# Patient Record
Sex: FEMALE | Race: WHITE | NOT HISPANIC OR LATINO | Employment: FULL TIME | URBAN - METROPOLITAN AREA
[De-identification: names, ages, dates, MRNs, and addresses within clinical notes are randomized per-mention and may not be internally consistent; named-entity substitution may affect disease eponyms.]

---

## 2018-08-23 ENCOUNTER — OFFICE VISIT (OUTPATIENT)
Dept: INTERNAL MEDICINE | Facility: MEDICAL CENTER | Age: 53
End: 2018-08-23
Payer: COMMERCIAL

## 2018-08-23 VITALS
SYSTOLIC BLOOD PRESSURE: 125 MMHG | OXYGEN SATURATION: 96 % | DIASTOLIC BLOOD PRESSURE: 70 MMHG | HEIGHT: 63 IN | BODY MASS INDEX: 19.14 KG/M2 | HEART RATE: 83 BPM | TEMPERATURE: 98.1 F | WEIGHT: 108 LBS

## 2018-08-23 DIAGNOSIS — Z00.00 HEALTHCARE MAINTENANCE: ICD-10-CM

## 2018-08-23 DIAGNOSIS — Z86.32 HISTORY OF GESTATIONAL DIABETES: ICD-10-CM

## 2018-08-23 DIAGNOSIS — S46.002S INJURY OF LEFT ROTATOR CUFF, SEQUELA: ICD-10-CM

## 2018-08-23 DIAGNOSIS — Z98.890 HX OF ARTHROSCOPY OF SHOULDER: ICD-10-CM

## 2018-08-23 DIAGNOSIS — N95.1 VASOMOTOR SYMPTOMS DUE TO MENOPAUSE: ICD-10-CM

## 2018-08-23 DIAGNOSIS — Z82.49 FAMILY HISTORY OF CARDIAC DISORDER: ICD-10-CM

## 2018-08-23 DIAGNOSIS — Z72.0 TOBACCO USE: ICD-10-CM

## 2018-08-23 PROBLEM — S46.002A INJURY OF LEFT ROTATOR CUFF: Status: ACTIVE | Noted: 2018-08-23

## 2018-08-23 PROCEDURE — 99204 OFFICE O/P NEW MOD 45 MIN: CPT | Performed by: INTERNAL MEDICINE

## 2018-08-23 RX ORDER — VENLAFAXINE HYDROCHLORIDE 75 MG/1
75 CAPSULE, EXTENDED RELEASE ORAL DAILY
Qty: 30 CAP | Refills: 3 | Status: SHIPPED
Start: 2018-08-23 | End: 2018-11-05

## 2018-08-23 RX ORDER — ACETAMINOPHEN 325 MG/1
650 TABLET ORAL EVERY 6 HOURS PRN
Qty: 30 TAB | Refills: 1 | Status: SHIPPED
Start: 2018-08-23 | End: 2019-02-21

## 2018-08-23 RX ORDER — HYDROCODONE BITARTRATE AND ACETAMINOPHEN 7.5; 325 MG/1; MG/1
1-2 TABLET ORAL EVERY 6 HOURS PRN
COMMUNITY
End: 2018-08-23

## 2018-08-23 RX ORDER — MELOXICAM 7.5 MG/1
7.5 TABLET ORAL 2 TIMES DAILY PRN
Qty: 30 TAB | Refills: 1 | Status: SHIPPED
Start: 2018-08-23 | End: 2018-11-05

## 2018-08-23 RX ORDER — IBUPROFEN 200 MG
200 TABLET ORAL EVERY 6 HOURS PRN
COMMUNITY
End: 2018-08-23

## 2018-08-23 NOTE — PROGRESS NOTES
Michelle Shaw is a 53 y.o. female who is here to Establish care and is new to the clinic.     CC: Establish care, vasomotor symptoms, left shoulder surgery    HPI:    Patient is a 53-year-old female who is here to establish care today. Patient recently had a injury to her left rotator cuff and underwent left shoulder arthroscopy. She said she saw orthopedic in [facility] which is close to [place]. This happened 3 weeks ago. She says that she thinks she hurt herself at work in January with doing repetitive movements. She states that she was having numbness and tingling, was having panic attacks as well. She went to the ER at that time there was a CT scan which was done and they thought she was having a stroke and was admitted for MRI. She after that she saw orthopedic and did physical therapy for the months of February, March, April, May, and June. She went to see the orthopedic again but wasn't satisfied so saw another one who recommended surgery and thus when she got that done. Her arm is still in the sling. She has not gotten any physical therapy but was given orders for it before she moved here.    In regards to go her past medical history, patients that she does have a history of gestational diabetes. Patient was also on medication for it at some point for presumable diagnosis of diabetes. She now gets hypoglycemic episodes which she says her about twice a week. She says she eats healthy and tries to stay away from sugar. She also mentions a history of diverticulitis treated pregnancy but not having any problems at this time.    She doesn't really have any other past significant history. With her social history, she has been smoking cigarettes since the age of 17. She quit through her pregnancies but then went back to it. Her  also smokes and she is not interested in quitting at this time. In regards to alcohol use, patient does have a beer once in a blue moon and denies any other drug use. 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 She no longer uses any of those substances.    Patient is postmenopausal and she says she has a lot of hot flashes as well as mood changes and was wondering if she may be able to try estrogen. We talked about the risk of using estrogen as well as tobacco use at the scene time and patient is okay with trying different medication.    In regards to medications, patient is sitting ibuprofen at this time. When she wakes up, she takes 600 mg in the morning and 6 mg in the evening before going to sleep. She was given Norco for her pain as well but she says she took today and it knocked her off so she stopped taking it. She is not complaining of any acid problems or any problems with her kidneys in the past.    Patient does have allergies to aspirin and penicillin. She had lab work done before her surgery. Her colonoscopy was in 2017 and she also had a mammogram in 2017. She had a Pap smear 6 months ago which was normal. She is complaining of any other thing at this time.      No problem-specific Assessment & Plan notes found for this encounter.      She  has a past medical history of Gestational diabetes.    Patient Active Problem List    Diagnosis Date Noted   • Healthcare maintenance 08/23/2018   • History of gestational diabetes 08/23/2018   • Injury of left rotator cuff 08/23/2018   • Tobacco use 08/23/2018   • Hx of arthroscopy of shoulder 08/23/2018       Allergies:Aspirin and Penicillins    Current Outpatient Prescriptions   Medication Sig Dispense Refill   • acetaminophen (TYLENOL) 325 MG Tab Take 2 Tabs by mouth every 6 hours as needed. 30 Tab 1   • meloxicam (MOBIC) 7.5 MG Tab Take 1 Tab by mouth 2 times a day as needed. 30 Tab 1   • venlafaxine XR (EFFEXOR XR) 75 MG CAPSULE SR 24 HR Take 1 Cap by mouth every day. 30 Cap 3     No  "current facility-administered medications for this visit.        Social History   Substance Use Topics   • Smoking status: Current Every Day Smoker   • Smokeless tobacco: Never Used      Comment: 10 ciggs a day    • Alcohol use Yes      Comment: occass       Family History   Problem Relation Age of Onset   • Lung Disease Mother    • Heart Disease Mother    • Heart Disease Father    • Diabetes Father    • Hyperlipidemia Father    • No Known Problems Brother    • No Known Problems Brother      Review of Systems:   Pertinent positives as stated in HPI, all others reviewed as negative.    Physical Exam:  Blood pressure 125/70, pulse 83, temperature 36.7 °C (98.1 °F), height 1.6 m (5' 3\"), weight 49 kg (108 lb), SpO2 96 %. Body mass index is 19.13 kg/m².    General Appearance: healthy, alert, no distress, cooperative  Skin: Skin color, texture, turgor normal. No rashes or lesions.  Head: Normocephalic. No masses appreciated.   Eyes: PERRLA.  Ears: External ears normal.  Nose/Sinuses: Nares normal. Mucosa normal.   Oropharynx: Has Dentures (Used methamphetamine in the past which causes abscess in her oral cavity) Oropharynx moist and without lesion.  Neck: Neck supple. No adenopathy. Thyroid symmetric, normal size, and without nodularity.  Lungs: Lungs clear to auscultation bilaterally.  Heart: RRR without murmur, gallop, or rubs.  Abdomen: Soft, non-tender. BS normal.   Musculoskeletal: Extremities: Upper and lower extremities appear normal. No deformities, edema. Left shoulder in sling.   Peripheral Pulses: Pulses: radial=4/4, dorsalis pedis=4/4  Neurologic: Cranial nerves intact.   Psychiatric: Mood appears normal.     Assessment/Plan:     1. Healthcare maintenance  Colonoscopy was in 2017.  Mammogram was in 2017. She wants to do a mammogram every 2 years.  Pap smear was 6 months ago and was normal.  Will do routine labs.  She is going to be establishing with our ophthalmologist as well as a dentist.  - CBC WITH " DIFFERENTIAL; Future  - COMP METABOLIC PANEL; Future  - LIPID PROFILE; Future    2. History of gestational diabetes  No concerns at this time but she says she does have some episodes of hypoglycemia from time to time.  Check hemoglobin A1c.  - HEMOGLOBIN A1C; Future    3. Injury of left rotator cuff, sequela  Check vitamin D level.  Will make a referral for her to see orthopedic.  Recommended to stop ibuprofen and try meloxicam and alternated with Tylenol to see if that might help the pain.  Will have orthopedic determine when she can get physical therapy.  - VITAMIN D,25 HYDROXY; Future  - acetaminophen (TYLENOL) 325 MG Tab; Take 2 Tabs by mouth every 6 hours as needed.  Dispense: 30 Tab; Refill: 1  - meloxicam (MOBIC) 7.5 MG Tab; Take 1 Tab by mouth 2 times a day as needed.  Dispense: 30 Tab; Refill: 1  - REFERRAL TO ORTHOPEDICS    4. Hx of arthroscopy of shoulder  See above.  - VITAMIN D,25 HYDROXY; Future  - acetaminophen (TYLENOL) 325 MG Tab; Take 2 Tabs by mouth every 6 hours as needed.  Dispense: 30 Tab; Refill: 1  - meloxicam (MOBIC) 7.5 MG Tab; Take 1 Tab by mouth 2 times a day as needed.  Dispense: 30 Tab; Refill: 1  - REFERRAL TO ORTHOPEDICS    5. Family history of cardiac disorder  - LIPID PROFILE; Future    6. Tobacco use  She was counseled on cessation but she is not interested.  Will order PFTs at next visit.   - CBC WITH DIFFERENTIAL; Future    7. Vasomotor symptoms due to menopause  Patient wanted to talk about estrogen but with her tobacco use, we talked about how it is contraindicated and there is a lot of risk involved.  She is okay with trying Effexor and see if it may help her symptoms.  - venlafaxine XR (EFFEXOR XR) 75 MG CAPSULE SR 24 HR; Take 1 Cap by mouth every day.  Dispense: 30 Cap; Refill: 3      Followup: Return in about 3 months (around 11/23/2018), or if symptoms worsen or fail to improve.    This note was created using voice recognition software. There may be unintended errors  spelling, and grammar or content.

## 2018-10-02 ENCOUNTER — PHYSICAL THERAPY (OUTPATIENT)
Dept: PHYSICAL THERAPY | Facility: REHABILITATION | Age: 53
End: 2018-10-02
Attending: ORTHOPAEDIC SURGERY
Payer: COMMERCIAL

## 2018-10-02 DIAGNOSIS — M25.512 CHRONIC LEFT SHOULDER PAIN: ICD-10-CM

## 2018-10-02 DIAGNOSIS — M75.122 COMPLETE ROTATOR CUFF TEAR OR RUPTURE OF LEFT SHOULDER, NOT SPECIFIED AS TRAUMATIC: ICD-10-CM

## 2018-10-02 DIAGNOSIS — G89.29 CHRONIC LEFT SHOULDER PAIN: ICD-10-CM

## 2018-10-02 PROCEDURE — 97161 PT EVAL LOW COMPLEX 20 MIN: CPT

## 2018-10-02 PROCEDURE — 97014 ELECTRIC STIMULATION THERAPY: CPT

## 2018-10-02 ASSESSMENT — ENCOUNTER SYMPTOMS
PAIN TIMING: IN THE MORNING
PAIN SCALE AT HIGHEST: 10
QUALITY: ACHING
PAIN SCALE: 8
PAIN SCALE AT LOWEST: 2
QUALITY: TIGHT
QUALITY: TINGLING
ALLEVIATING FACTORS: REST

## 2018-10-02 NOTE — OP THERAPY EVALUATION
Outpatient Physical Therapy  INITIAL EVALUATION    [facility] [address]  [address]  [place] [address]  Phone:  [phone]  Fax:  [phone]    Date of Evaluation: 10/02/2018    Patient: Michelle Shaw  YOB: 1965  MRN: [id number]     Referring Provider: Chandu Hamilton M.D.  [address] [place] [address]   Referring Diagnosis Complete rotator cuff tear or rupture of left shoulder, not specified as traumatic [M75.122];Complete rupture of rotator cuff [M75.120];Pain in left shoulder [M25.512]     Time Calculation  Start time: 0900  Stop time: 0953 Time Calculation (min): 53 minutes     Physical Therapy Occurrence Codes    Date physical therapy care plan established or reviewed:  10/2/18   Date physical therapy treatment started:  10/2/18          Chief Complaint: Shoulder Problem    Visit Diagnoses     ICD-10-CM   1. Chronic left shoulder pain M25.512    G89.29   2. Complete rotator cuff tear or rupture of left shoulder, not specified as traumatic M75.122         Subjective:   History of Present Illness:     Mechanism of injury:    Pt presents with complaint of L shoulder pain.  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 She was having numbness and tingling in the L arm, as well as panic attacks. She went to the ED due to feeling like she was having a stroke, a CT scan was done and cleared.  Ultimately MRI of the shoulder and neck, some tearing of the supra. See reports in media.  Saw an orthopedic in [place] area and she did physical therapy from February-June, poor overall progress. Poor response to injections.  She went to see the orthopedic again, ultimately surgery was recommended and completed 7/31/18 (in [place]).  She moved shortly after surgery and post-op care has been delayed.  Feels like the shoulder is worse than pre-op in regards to movement and pain.     Prior level of function:  Working FT, on disability since the injury. Hobbies prior to injury: walking, cooking.   Sleep disturbance:  Interrupted sleep  Pain:     Current pain ratin    At best pain ratin    At worst pain rating:  10    Quality:  Aching, tight and tingling    Pain timing:  In the morning    Relieving factors:  Rest (Ibuprofen 3x/day (3-4 200 mg per day), hot water in the shower)    Exacerbated by: any use of the L UE.    Progression:  Stable  Social Support:     Lives in:  Multiple-level home and apartment    Lives with:  Spouse  Hand dominance:  Right  Diagnostic Tests:     MRI studies: abnormal    Treatments:     Previous treatment:  Physical therapy and injection treatment  Patient Goals:     Patient goals for therapy:  Decreased pain, increased motion, independence with ADLs/IADLs, return to work, increased strength and return to sport/leisure activities      Past Medical History:   Diagnosis Date   • Gestational diabetes      Past Surgical History:   Procedure Laterality Date   • ACL REPAIR Bilateral    • APPENDECTOMY     • SHOULDER ARTHROSCOPY Left    • TUBAL COAGULATION LAPAROSCOPIC BILATERAL       Social History   Substance Use Topics   • Smoking status: Current Every Day Smoker   • Smokeless tobacco: Never Used      Comment: 10 ciggs a day    • Alcohol use Yes      Comment: occass     Family and Occupational History     Social History   • Marital status:      Spouse name: N/A   • Number of children: N/A   • Years of education: N/A       Objective     Observations   Left Shoulder   Positive for incision.     Additional Observation Details  Incision is well healed and mobile.  Mild redness at the anterior port.     Postural Observations  Seated posture: poor  Standing posture: poor    Additional Postural Observation Details  Ant seated GHJ, severe fwd shoulder.     Neurological Testing     Sensation     Shoulder   Left Shoulder   Intact: light touch    Right Shoulder    Intact: light touch    Reflexes   Left   Biceps (C5/C6): normal (2+)  Brachioradialis (C6): normal (2+)    Right   Biceps (C5/C6): normal (2+)  Brachioradialis (C6): normal (2+)    Palpation   Left   Tenderness of the anterior deltoid, biceps, infraspinatus, levator scapulae, pectoralis major, pectoralis minor, subscapularis, supraspinatus and upper trapezius.     Tenderness     Left Shoulder   Tenderness in the bicipital groove, infraspinatus tendon, subacromial bursa, subscapularis tendon and supraspinatus tendon.     Active Range of Motion   Left Shoulder   Flexion: 70 degrees with pain  Abduction: 55 degrees with pain  External rotation BTH: Active external rotation behind the head: occiput. with pain  Internal rotation BTB: Active internal rotation behind the back: glute. with pain    Right Shoulder   Normal active range of motion    Scapular Mobility   Left Shoulder   Scapular mobility: fair    Right Shoulder   Scapular mobility: good    Joint Play   Left Shoulder     Anterior capsule: hypomobile    Posterior capsule: hypomobile    Inferior capsule: hypomobile    1st rib: hypomobile    Strength:      Left Shoulder   Planes of Motion   Flexion: 3   Extension: 4-   Abduction: 3-   Adduction: 3+   External rotation at 0°: 3+   Internal rotation at 0°: 3+     Right Shoulder   Normal muscle strength        Therapeutic Treatments and Modalities:     1. E Stim Unattended (CPT 36254), IFC and MH to L shoulder x 15 min    Therapeutic Treatment and Modalities Summary: HO given for TENS unit.  Brief pain education.  Encouraged to move (AAROM) as often as tolerated.     Time-based treatments/modalities:          Assessment, Response and Plan:   Impairments: abnormal or restricted ROM, activity intolerance, difficulty performing job, impaired physical strength, lacks appropriate home exercise program, limited ADL's and pain with function    Assessment details:  Mrs. Shaw is a 53 y.o female who presents to PT 9 weeks  s/p L shoulder arthroscopic debridement and SAD.  PT arun shows her current status to be behind what would be expected for this stage of recovery, which may be related to delayed therapy during her move. She has severely high pain levels and pain behavior.  Still ambulates with the UE crossed over her body.  RC strength is poor and AROM elevation limited to less than 70 degrees, but PROM is 75% of full.  She is unable to use the L UE for any functional activity and dependent on ibuprofen for pain control. Skilled PT services are indicated to address the mentioned functional limitations and enhance QOL.     Prognosis: good    Goals:   Short Term Goals:   - Able to indep set scaps without cuing  - L shoulder AROM elevation to 90 deg with less than 4/10 pain  - Able to reach HBB to L5  Short term goal time span:  2-4 weeks      Long Term Goals:    - Improve Quick DASH at least 30%  - Able to reach readily into overhead cabinets with less than 3/10 pain  - Indep with HEP  Long term goal time span:  6-8 weeks    Plan:   Therapy options:  Physical therapy treatment to continue  Planned therapy interventions:  E Stim Unattended (CPT 13487), Manual Therapy (CPT 69983), Neuromuscular Re-education (CPT 66549), Functional Training, Self Care (CPT 12442), Therapeutic Exercise (CPT 21834) and Therapeutic Activities (CPT 24262)  Frequency:  2x week  Duration in weeks:  6  Discussed with:  Patient    Functional Limitation G-Codes and Severity Modifiers  Quickdash General Total Score: 86.36     Referring provider co-signature:  I have reviewed this plan of care and my co-signature certifies the need for services.  Certification Dates:   From 10/2/18     To 11/13/18    Physician Signature: ________________________________ Date: ______________

## 2018-10-04 ENCOUNTER — PHYSICAL THERAPY (OUTPATIENT)
Dept: PHYSICAL THERAPY | Facility: REHABILITATION | Age: 53
End: 2018-10-04
Attending: ORTHOPAEDIC SURGERY
Payer: COMMERCIAL

## 2018-10-04 DIAGNOSIS — G89.29 CHRONIC LEFT SHOULDER PAIN: ICD-10-CM

## 2018-10-04 DIAGNOSIS — M75.122 COMPLETE ROTATOR CUFF TEAR OR RUPTURE OF LEFT SHOULDER, NOT SPECIFIED AS TRAUMATIC: ICD-10-CM

## 2018-10-04 DIAGNOSIS — M25.512 CHRONIC LEFT SHOULDER PAIN: ICD-10-CM

## 2018-10-04 PROCEDURE — 97110 THERAPEUTIC EXERCISES: CPT

## 2018-10-04 PROCEDURE — 97014 ELECTRIC STIMULATION THERAPY: CPT

## 2018-10-04 NOTE — OP THERAPY DAILY TREATMENT
"  Outpatient Physical Therapy  DAILY TREATMENT     [facility] [address].  [address]  [place] [address]  Phone:  [phone]  Fax:  [phone]    Date: 10/04/2018    Patient: Michelle Shaw  YOB: 1965  MRN: [id number]     Time Calculation  Start time: 1100  Stop time: 1145 Time Calculation (min): 45 minutes     Chief Complaint: Shoulder Problem    Visit #: 2    SUBJECTIVE:  Trying not to baby it any more.  Seems to be ok. Ready to get back to normal.     OBJECTIVE:  Current objective measures: walks with L UE swinging to the side.         Therapeutic Exercises (CPT 48875):     1. Pulleys, 2 min    2. Roller, prot/retraction, angels 1/3 ROM, dowel bench press, GHJ flexion not well tolerated even with dowel    3. Scap circles, x 15 ea way    4. Rows, L1 x 20    5. Ball vs wall flexion, x 15 with focus on scap setting      Therapeutic Exercise Summary: L1 TB given    Therapeutic Treatments and Modalities:     1. E Stim Unattended (CPT 59358), Austrian to infra and supra 5/5\" with ball roll, 10 min active and 5 min MH.     Time-based treatments/modalities:  Therapeutic exercise minutes (CPT 99455): 30 minutes       ASSESSMENT:   Response to treatment: Significant improvement in AROM, guarding and pain behaviors compared to eval.     PLAN/RECOMMENDATIONS:   Plan for treatment: therapy treatment to continue next visit.  Planned interventions for next visit: continue with current treatment. Scap stab, post cuff strength, AROM      "

## 2018-10-09 ENCOUNTER — PHYSICAL THERAPY (OUTPATIENT)
Dept: PHYSICAL THERAPY | Facility: REHABILITATION | Age: 53
End: 2018-10-09
Attending: ORTHOPAEDIC SURGERY
Payer: COMMERCIAL

## 2018-10-09 DIAGNOSIS — G89.29 CHRONIC LEFT SHOULDER PAIN: ICD-10-CM

## 2018-10-09 DIAGNOSIS — M25.512 CHRONIC LEFT SHOULDER PAIN: ICD-10-CM

## 2018-10-09 DIAGNOSIS — M75.122 COMPLETE ROTATOR CUFF TEAR OR RUPTURE OF LEFT SHOULDER, NOT SPECIFIED AS TRAUMATIC: ICD-10-CM

## 2018-10-09 PROCEDURE — 97110 THERAPEUTIC EXERCISES: CPT

## 2018-10-09 PROCEDURE — 97014 ELECTRIC STIMULATION THERAPY: CPT

## 2018-10-09 NOTE — OP THERAPY DAILY TREATMENT
"  Outpatient Physical Therapy  DAILY TREATMENT     [facility] [address].  [address]  [place] [address]  Phone:  [phone]  Fax:  [phone]    Date: 10/09/2018    Patient: Michelle Shaw  YOB: 1965  MRN: [id number]     Time Calculation  Start time: 0908  Stop time: 0955 Time Calculation (min): 47 minutes     Chief Complaint: Shoulder Problem    Visit #: 3    SUBJECTIVE:  Doing ok, trying to work with the shoulder often at home.     OBJECTIVE:        Therapeutic Exercises (CPT 18544):     1. UBE , 4 min alt, L1 slow    2. Prone \"I, T Y\", x 10 ea way, needing cues 80% of reps to correct scap position.     3. Rows, L2 x 20    4. Pullbacks, L2 x 20    5. Quad prot/ret, x 15    6. Active shoulder flexion with focus on setting. , x 15 reps    Therapeutic Treatments and Modalities:     1. E Stim Unattended (CPT 91150), [place] Timorese to infra and supra 5/5\" with ball roll, 15 min    Time-based treatments/modalities:  Therapeutic exercise minutes (CPT 63100): 28 minutes       ASSESSMENT:   Response to treatment: Max cuing to achieve appropriate scap setting.  Extreme UT over recruitment, but is receptive to education.     PLAN/RECOMMENDATIONS:   Plan for treatment: therapy treatment to continue next visit.  Planned interventions for next visit: continue with current treatment.      "

## 2018-10-11 ENCOUNTER — PHYSICAL THERAPY (OUTPATIENT)
Dept: PHYSICAL THERAPY | Facility: REHABILITATION | Age: 53
End: 2018-10-11
Attending: ORTHOPAEDIC SURGERY
Payer: COMMERCIAL

## 2018-10-11 DIAGNOSIS — M25.512 CHRONIC LEFT SHOULDER PAIN: ICD-10-CM

## 2018-10-11 DIAGNOSIS — M75.122 COMPLETE ROTATOR CUFF TEAR OR RUPTURE OF LEFT SHOULDER, NOT SPECIFIED AS TRAUMATIC: ICD-10-CM

## 2018-10-11 DIAGNOSIS — G89.29 CHRONIC LEFT SHOULDER PAIN: ICD-10-CM

## 2018-10-11 PROCEDURE — 97014 ELECTRIC STIMULATION THERAPY: CPT

## 2018-10-11 PROCEDURE — 97110 THERAPEUTIC EXERCISES: CPT

## 2018-10-11 NOTE — OP THERAPY DAILY TREATMENT
"  Outpatient Physical Therapy  DAILY TREATMENT     [facility] [address].  [address]  [place] [address]  Phone:  [phone]  Fax:  [phone]    Date: 10/11/2018    Patient: Michelle Shaw  YOB: 1965  MRN: [id number]     Time Calculation  Start time: 1029  Stop time: 1120 Time Calculation (min): 51 minutes     Chief Complaint: Shoulder Problem    Visit #: 4    SUBJECTIVE:  No new complaints.  Really working on getting better at setting shoulders.     OBJECTIVE:      Therapeutic Exercises (CPT 69727):     1. UBE , 4 min alt, L1 slow    2. Prone \"I, T Y\", x 10 ea way    3. Quad prot/ret, x 20    4. Quad rock back with set shoulders, x 20    5. Wall jignesh, x 10    6. Foam roller shoulder flexion, 2x8    7. Banded OH stretch, x 2 min    Therapeutic Treatments and Modalities:     1. E Stim Unattended (CPT 30899), Wallisian to infra and supra 5/5\" with ball roll, 15 min    Time-based treatments/modalities:  Therapeutic exercise minutes (CPT 79766): 30 minutes       ASSESSMENT:   Response to treatment: Decreased cuing to 50% of reps for scap setting.  L shoulder AROM ff= 140, abd= 125, HBH C4 and HBB= sacrum.  All still painful EOR.     PLAN/RECOMMENDATIONS:   Plan for treatment: therapy treatment to continue next visit.  Planned interventions for next visit: continue with current treatment.      "

## 2018-10-16 ENCOUNTER — PHYSICAL THERAPY (OUTPATIENT)
Dept: PHYSICAL THERAPY | Facility: REHABILITATION | Age: 53
End: 2018-10-16
Attending: ORTHOPAEDIC SURGERY
Payer: COMMERCIAL

## 2018-10-16 DIAGNOSIS — M25.512 CHRONIC LEFT SHOULDER PAIN: ICD-10-CM

## 2018-10-16 DIAGNOSIS — G89.29 CHRONIC LEFT SHOULDER PAIN: ICD-10-CM

## 2018-10-16 DIAGNOSIS — M75.122 COMPLETE ROTATOR CUFF TEAR OR RUPTURE OF LEFT SHOULDER, NOT SPECIFIED AS TRAUMATIC: ICD-10-CM

## 2018-10-16 PROCEDURE — 97014 ELECTRIC STIMULATION THERAPY: CPT

## 2018-10-16 PROCEDURE — 97110 THERAPEUTIC EXERCISES: CPT

## 2018-10-16 NOTE — OP THERAPY DAILY TREATMENT
"  Outpatient Physical Therapy  DAILY TREATMENT     [facility] Physical [address]  [address]  [place] [address]  Phone:  [phone]  Fax:  [phone]    Date: 10/16/2018    Patient: Michelle Shaw  YOB: 1965  MRN: [id number]     Time Calculation  Start time: 1001  Stop time: 1056 Time Calculation (min): 55 minutes     Chief Complaint: Shoulder Problem    Visit #: 5    SUBJECTIVE:  Did ok after LV.  Fwd reaching is becoming easier, but having a very hard time reaching behind the back.     OBJECTIVE:      Therapeutic Exercises (CPT 75921):     1. UBE , 5 min alt, L2    2. Pulleys HBB, x 2 min    3. Doorway pec stretch, 3 level, 30\" ea     4. Prone \"I, T, Y\" , x 10 ea with 1# weight    5. Counter push up, x 10    6. Standing scaption , 1# x 10    7. Standing \"statue of liberty\", 1# x 10    Therapeutic Treatments and Modalities:     1. E Stim Unattended (CPT 85851), Andorran to infra and supra 5/5\" with prone shoulder flexion, 10 min active and 5 min MH    Time-based treatments/modalities:  Therapeutic exercise minutes (CPT 11303): 39 minutes     ASSESSMENT:   Response to treatment: Steady gains in AROM and now able to tolerate light weight.     PLAN/RECOMMENDATIONS:   Plan for treatment: therapy treatment to continue next visit.  Planned interventions for next visit: continue with current treatment. RC and scapular strength progressions.        "

## 2018-10-18 ENCOUNTER — PHYSICAL THERAPY (OUTPATIENT)
Dept: PHYSICAL THERAPY | Facility: REHABILITATION | Age: 53
End: 2018-10-18
Attending: ORTHOPAEDIC SURGERY
Payer: COMMERCIAL

## 2018-10-18 DIAGNOSIS — M75.122 COMPLETE ROTATOR CUFF TEAR OR RUPTURE OF LEFT SHOULDER, NOT SPECIFIED AS TRAUMATIC: ICD-10-CM

## 2018-10-18 DIAGNOSIS — M25.512 CHRONIC LEFT SHOULDER PAIN: ICD-10-CM

## 2018-10-18 DIAGNOSIS — G89.29 CHRONIC LEFT SHOULDER PAIN: ICD-10-CM

## 2018-10-18 PROCEDURE — 97014 ELECTRIC STIMULATION THERAPY: CPT

## 2018-10-18 PROCEDURE — 97110 THERAPEUTIC EXERCISES: CPT

## 2018-10-18 NOTE — OP THERAPY DAILY TREATMENT
"  Outpatient Physical Therapy  DAILY TREATMENT     [facility] [address].  [address]  [place] [address]  Phone:  [phone]  Fax:  [phone]    Date: 10/18/2018    Patient: Michelle Shaw  YOB: 1965  MRN: [id number]     Time Calculation  Start time: 0815  Stop time: 0909 Time Calculation (min): 54 minutes     Chief Complaint: Shoulder Problem    Visit #: 6    SUBJECTIVE:  Achy, mostly due to the cold weather. Heat helps.     OBJECTIVE:      Therapeutic Exercises (CPT 66448):     1. UBE , standing, 4 min alt    2. Pulleys HBB, x 2 min    3. Doorway pec stretch, 3 level, 30\" ea     4. Rows and pullbacks, L2 x 20 ea    5. Body blade, to side x30\" ea, in flexion is too painful.     6. Ball toss, orange to above doorway, x 15    7. Mini band vs wall, L1: reach out, then diagonal. x 10 ea.     8. Roller, alt GHJ flex, pec stretch, jignesh.     Therapeutic Treatments and Modalities:     1. E Stim Unattended (CPT 93046), Surinamese to infra and supra 5/5\" with prone shoulder flexion, 10 min active and 5 min MH    Time-based treatments/modalities:  Therapeutic exercise minutes (CPT 34322): 40 minutes       ASSESSMENT:   Response to treatment:  Steadily able to increase load during therex.  Fair tolerance to more dynamic movements.     PLAN/RECOMMENDATIONS:   Plan for treatment: therapy treatment to continue next visit.  Planned interventions for next visit: continue with current treatment. Scap and RC strength    "

## 2018-10-23 ENCOUNTER — PHYSICAL THERAPY (OUTPATIENT)
Dept: PHYSICAL THERAPY | Facility: REHABILITATION | Age: 53
End: 2018-10-23
Attending: ORTHOPAEDIC SURGERY
Payer: COMMERCIAL

## 2018-10-23 DIAGNOSIS — M75.122 COMPLETE ROTATOR CUFF TEAR OR RUPTURE OF LEFT SHOULDER, NOT SPECIFIED AS TRAUMATIC: ICD-10-CM

## 2018-10-23 DIAGNOSIS — M25.512 CHRONIC LEFT SHOULDER PAIN: ICD-10-CM

## 2018-10-23 DIAGNOSIS — G89.29 CHRONIC LEFT SHOULDER PAIN: ICD-10-CM

## 2018-10-23 PROCEDURE — 97140 MANUAL THERAPY 1/> REGIONS: CPT

## 2018-10-23 PROCEDURE — 97014 ELECTRIC STIMULATION THERAPY: CPT

## 2018-10-23 PROCEDURE — 97110 THERAPEUTIC EXERCISES: CPT

## 2018-10-23 NOTE — OP THERAPY DAILY TREATMENT
"  Outpatient Physical Therapy  DAILY TREATMENT     [facility] Physical [address]  [address]  [place] [address]  Phone:  [phone]  Fax:  [phone]    Date: 10/23/2018    Patient: Michelle Shaw  YOB: 1965  MRN: [id number]     Time Calculation  Start time: 1030  Stop time: 1121 Time Calculation (min): 51 minutes     Chief Complaint: Shoulder Problem    Visit #: 7    SUBJECTIVE:  Feels as though she made a lot of progress early on, but slowing.  Pain is about the same.     OBJECTIVE:  pre tx: rm=567, abd=80, hbb to glute.  Complains of pulling in axilla    Therapeutic Exercises (CPT 13368):     1. UBE , 5 min alt    2. Pulleys HBB, x 2 min    3. Doorway pec stretch, 3 level, 30\" ea     Therapeutic Treatments and Modalities:     1. E Stim Unattended (CPT 06559), Liberian to infra and supra 5/5\" with prone shoulder flexion, 10 min active and 5 min MH    2. Manual Therapy (CPT 62624), PROM all planes with GHJ mobs GIII respectively.  SL scapular mobs, subscap and teres minor SCS with 90\" hold. Neuro re-ed AAROM in flex and ER.     Time-based treatments/modalities:  Manual therapy minutes (CPT 30566): 20 minutes  Therapeutic exercise minutes (CPT 72313): 10 minutes       ASSESSMENT:   Response to treatment: Post tx AROM improved ff= 130 deg, abd= 90 deg, HBB still glute.  Educated to try tennis ball subscap release.     PLAN/RECOMMENDATIONS:   Plan for treatment: therapy treatment to continue next visit.  Planned interventions for next visit: continue with current treatment. Cont lat/subscap work, post cuff strength to improve AROM      "

## 2018-10-25 ENCOUNTER — PHYSICAL THERAPY (OUTPATIENT)
Dept: PHYSICAL THERAPY | Facility: REHABILITATION | Age: 53
End: 2018-10-25
Attending: ORTHOPAEDIC SURGERY
Payer: COMMERCIAL

## 2018-10-25 DIAGNOSIS — M75.122 COMPLETE ROTATOR CUFF TEAR OR RUPTURE OF LEFT SHOULDER, NOT SPECIFIED AS TRAUMATIC: ICD-10-CM

## 2018-10-25 DIAGNOSIS — G89.29 CHRONIC LEFT SHOULDER PAIN: ICD-10-CM

## 2018-10-25 DIAGNOSIS — M25.512 CHRONIC LEFT SHOULDER PAIN: ICD-10-CM

## 2018-10-25 PROCEDURE — 97014 ELECTRIC STIMULATION THERAPY: CPT

## 2018-10-25 PROCEDURE — 97140 MANUAL THERAPY 1/> REGIONS: CPT

## 2018-10-25 PROCEDURE — 97110 THERAPEUTIC EXERCISES: CPT

## 2018-10-25 NOTE — OP THERAPY DAILY TREATMENT
"  Outpatient Physical Therapy  DAILY TREATMENT     [facility] Physical [address].  [address]  [place] [address]  Phone:  [phone]  Fax:  [phone]    Date: 10/25/2018    Patient: Michelle Shaw  YOB: 1965  MRN: [id number]     Time Calculation  Start time: 1033  Stop time: 1123 Time Calculation (min): 50 minutes     Chief Complaint: Shoulder Problem    Visit #: 8    SUBJECTIVE:  Sore.  Seems like still has some motion gained from LV. Tried using her hand to massage under the arm some.     OBJECTIVE:  Current objective measures: post tx AROM: ff= 150, abd= 120, IR= L3        Therapeutic Exercises (CPT 58476):     1. UBE , 5 min alt    2. Pulleys HBB, x 2 min    3. Prone W's, x 10    4. Prone angels, x 10, able to reach to full height= to R    Therapeutic Treatments and Modalities:     1. E Stim Unattended (CPT 20075), Norwegian to infra and supra 5/5\" with prone shoulder flexion, 10 min active and 5 min MH    2. Manual Therapy (CPT 79554), PROM all planes with GHJ mobs GIII respectively.  SL scapular mobs, subscap and teres minor SCS with 90\" hold. Neuro re-ed AAROM in flex and ER.     Time-based treatments/modalities:  Manual therapy minutes (CPT 94940): 20 minutes  Therapeutic exercise minutes (CPT 71102): 10 minutes       ASSESSMENT:   Response to treatment: Cont sig improvements in AROM bw sessions and now nearing normal.  Starting TNE with cuing for more positive self talk to redefine the shoulder and its improving function.     PLAN/RECOMMENDATIONS:   Plan for treatment: therapy treatment to continue next visit.  Planned interventions for next visit: continue with current treatment.      "

## 2018-10-30 ENCOUNTER — PHYSICAL THERAPY (OUTPATIENT)
Dept: PHYSICAL THERAPY | Facility: REHABILITATION | Age: 53
End: 2018-10-30
Attending: ORTHOPAEDIC SURGERY
Payer: COMMERCIAL

## 2018-10-30 DIAGNOSIS — M75.122 COMPLETE ROTATOR CUFF TEAR OR RUPTURE OF LEFT SHOULDER, NOT SPECIFIED AS TRAUMATIC: ICD-10-CM

## 2018-10-30 DIAGNOSIS — G89.29 CHRONIC LEFT SHOULDER PAIN: ICD-10-CM

## 2018-10-30 DIAGNOSIS — M25.512 CHRONIC LEFT SHOULDER PAIN: ICD-10-CM

## 2018-10-30 PROCEDURE — 97110 THERAPEUTIC EXERCISES: CPT

## 2018-10-30 PROCEDURE — 97014 ELECTRIC STIMULATION THERAPY: CPT

## 2018-10-30 NOTE — OP THERAPY DAILY TREATMENT
"  Outpatient Physical Therapy  DAILY TREATMENT     [facility] Physical [address].  [address]  [place] [address]  Phone:  [phone]  Fax:  [phone]    Date: 10/30/2018    Patient: Michelle Shaw  YOB: 1965  MRN: [id number]     Time Calculation  Start time: 1135  Stop time: 1220 Time Calculation (min): 45 minutes     Chief Complaint: Shoulder Problem    Visit #: 9    SUBJECTIVE:  Has been achy with the cold weather.  Presents with new Rx for 6 weeks more of PT.  Feels the shoulder is 65-70% improved overall.     OBJECTIVE:      Therapeutic Exercises (CPT 15320):     1. UBE , 5 min alt    2. Pulleys HBB, x 2 min    3. Roller, alt GHJ flex, jignesh, pec stretch    4. Prone angels, x 10, able to reach to full height= to R    5. [place] hold, back to wall x 1 min    6. Wall slide flexion with L0 TB, x 20    7. Pole stretch for lats. , x 1 min    8. L ulnar glide, x 1 min    14. UPOC 11/13/18    Therapeutic Treatments and Modalities:     1. E Stim Unattended (CPT 79378), Ukrainian to infra and supra 5/5\" with prone shoulder flexion, 10 min active and 5 min MH    Time-based treatments/modalities:  Therapeutic exercise minutes (CPT 99417): 30 minutes       ASSESSMENT:   Response to treatment: AROM hv=646, abd=140, HBH= C3 limited by ulnar neural tension and improves with glides.  Limited endurance on post cuff, but improving.     PLAN/RECOMMENDATIONS:   Plan for treatment: therapy treatment to continue next visit.  Planned interventions for next visit: continue with current treatment.      "

## 2018-11-01 ENCOUNTER — TELEPHONE (OUTPATIENT)
Dept: INTERNAL MEDICINE | Facility: MEDICAL CENTER | Age: 53
End: 2018-11-01

## 2018-11-01 ENCOUNTER — PHYSICAL THERAPY (OUTPATIENT)
Dept: PHYSICAL THERAPY | Facility: REHABILITATION | Age: 53
End: 2018-11-01
Attending: ORTHOPAEDIC SURGERY
Payer: COMMERCIAL

## 2018-11-01 DIAGNOSIS — G89.29 CHRONIC LEFT SHOULDER PAIN: ICD-10-CM

## 2018-11-01 DIAGNOSIS — M75.100 TEAR OF ROTATOR CUFF, UNSPECIFIED LATERALITY, UNSPECIFIED TEAR EXTENT: ICD-10-CM

## 2018-11-01 DIAGNOSIS — M25.512 LEFT SHOULDER PAIN, UNSPECIFIED CHRONICITY: ICD-10-CM

## 2018-11-01 DIAGNOSIS — M75.122 COMPLETE ROTATOR CUFF TEAR OR RUPTURE OF LEFT SHOULDER, NOT SPECIFIED AS TRAUMATIC: ICD-10-CM

## 2018-11-01 DIAGNOSIS — M25.512 CHRONIC LEFT SHOULDER PAIN: ICD-10-CM

## 2018-11-01 PROCEDURE — 97530 THERAPEUTIC ACTIVITIES: CPT

## 2018-11-01 PROCEDURE — 97110 THERAPEUTIC EXERCISES: CPT

## 2018-11-01 PROCEDURE — 97014 ELECTRIC STIMULATION THERAPY: CPT

## 2018-11-01 NOTE — OP THERAPY DAILY TREATMENT
Outpatient Physical Therapy  DAILY TREATMENT     [facility] Physical Therapy [address].  [address]  [place] [address]  Phone:  [phone]  Fax:  [phone]    Date: 11/01/2018    Patient: Michelle Shaw  YOB: 1965  MRN: [id number]     Time Calculation  Start time: 1035  Stop time: 1120 Time Calculation (min): 45 minutes     Chief Complaint: Shoulder Problem    Visit #: 10    SUBJECTIVE:  Achy due to the cold weather.  Made appt with rheumatologist. Concerned about arthritis in the joint.     OBJECTIVE:      Therapeutic Exercises (CPT 81940):     1. UBE , 5 min alt    2. Pulleys HBB, x 3 min    3. Roller, alt GHJ flex, jignesh, pec stretch    4. Prone angels, x 10, able to reach to full height= to R    14. UPOC 11/13/18    Therapeutic Treatments and Modalities:     1. E Stim Unattended (CPT 31715), IFC and MH to L shoulder x 15 min    2. Therapeutic Activities (CPT 68560), TNE education: pain threshold graph, discussing importance of movement, positive self talk, laterallity training considering the chronicity of her problem.     Time-based treatments/modalities:  Therapeutic exercise minutes (CPT 03434): 15 minutes  Therapeutic activity minutes (CPT 98411): 15 minutes       ASSESSMENT:   Response to treatment: Decreased pain reported from 8/10 to 1/10 with gentle movement and pain education.  Pt very receptive. Will work on improved self talk at home. No concerns regarding OA/degenerative changes as her ROM and strength is improving consistently.     PLAN/RECOMMENDATIONS:   Plan for treatment: therapy treatment to continue next visit.  Planned interventions for next visit: continue with current treatment.

## 2018-11-01 NOTE — TELEPHONE ENCOUNTER
VOICEMAIL  1. Caller Name: Michelle Shaw  Call Back Number: [phone] (home)       2. Message: Would like to get further assistant with her ortho.  They told her today they can't do anything else for her.     3. Patient approves office to leave a detailed voicemail/MyChart message: N\A    Please advise.

## 2018-11-05 ENCOUNTER — PHYSICAL THERAPY (OUTPATIENT)
Dept: PHYSICAL THERAPY | Facility: REHABILITATION | Age: 53
End: 2018-11-05
Attending: ORTHOPAEDIC SURGERY
Payer: COMMERCIAL

## 2018-11-05 ENCOUNTER — OFFICE VISIT (OUTPATIENT)
Dept: MEDICAL GROUP | Age: 53
End: 2018-11-05
Payer: COMMERCIAL

## 2018-11-05 ENCOUNTER — TELEPHONE (OUTPATIENT)
Dept: MEDICAL GROUP | Age: 53
End: 2018-11-05

## 2018-11-05 VITALS
DIASTOLIC BLOOD PRESSURE: 68 MMHG | TEMPERATURE: 98.6 F | OXYGEN SATURATION: 90 % | WEIGHT: 116 LBS | SYSTOLIC BLOOD PRESSURE: 124 MMHG | BODY MASS INDEX: 20.55 KG/M2 | HEIGHT: 63 IN | HEART RATE: 86 BPM

## 2018-11-05 DIAGNOSIS — R23.2 HOT FLASHES: ICD-10-CM

## 2018-11-05 DIAGNOSIS — G89.29 CHRONIC LEFT SHOULDER PAIN: ICD-10-CM

## 2018-11-05 DIAGNOSIS — Z00.00 PE (PHYSICAL EXAM), ANNUAL: ICD-10-CM

## 2018-11-05 DIAGNOSIS — M25.512 CHRONIC LEFT SHOULDER PAIN: ICD-10-CM

## 2018-11-05 DIAGNOSIS — F41.8 DEPRESSION WITH ANXIETY: ICD-10-CM

## 2018-11-05 DIAGNOSIS — H61.22 IMPACTED CERUMEN OF LEFT EAR: ICD-10-CM

## 2018-11-05 DIAGNOSIS — Z12.31 ENCOUNTER FOR SCREENING MAMMOGRAM FOR BREAST CANCER: ICD-10-CM

## 2018-11-05 DIAGNOSIS — S46.002S INJURY OF LEFT ROTATOR CUFF, SEQUELA: ICD-10-CM

## 2018-11-05 DIAGNOSIS — Z23 NEED FOR VACCINATION: ICD-10-CM

## 2018-11-05 DIAGNOSIS — Z72.0 TOBACCO USE: ICD-10-CM

## 2018-11-05 DIAGNOSIS — Z12.11 COLON CANCER SCREENING: ICD-10-CM

## 2018-11-05 DIAGNOSIS — M75.122 COMPLETE ROTATOR CUFF TEAR OR RUPTURE OF LEFT SHOULDER, NOT SPECIFIED AS TRAUMATIC: ICD-10-CM

## 2018-11-05 PROBLEM — Z98.890: Status: RESOLVED | Noted: 2018-08-23 | Resolved: 2018-11-05

## 2018-11-05 PROCEDURE — 90472 IMMUNIZATION ADMIN EACH ADD: CPT | Performed by: FAMILY MEDICINE

## 2018-11-05 PROCEDURE — 99204 OFFICE O/P NEW MOD 45 MIN: CPT | Mod: 25 | Performed by: FAMILY MEDICINE

## 2018-11-05 PROCEDURE — 97140 MANUAL THERAPY 1/> REGIONS: CPT

## 2018-11-05 PROCEDURE — 97110 THERAPEUTIC EXERCISES: CPT

## 2018-11-05 PROCEDURE — 90471 IMMUNIZATION ADMIN: CPT | Performed by: FAMILY MEDICINE

## 2018-11-05 PROCEDURE — 97014 ELECTRIC STIMULATION THERAPY: CPT

## 2018-11-05 PROCEDURE — 90732 PPSV23 VACC 2 YRS+ SUBQ/IM: CPT | Performed by: FAMILY MEDICINE

## 2018-11-05 PROCEDURE — 90715 TDAP VACCINE 7 YRS/> IM: CPT | Performed by: FAMILY MEDICINE

## 2018-11-05 PROCEDURE — 90686 IIV4 VACC NO PRSV 0.5 ML IM: CPT | Performed by: FAMILY MEDICINE

## 2018-11-05 PROCEDURE — 69210 REMOVE IMPACTED EAR WAX UNI: CPT | Performed by: FAMILY MEDICINE

## 2018-11-05 RX ORDER — MELOXICAM 15 MG/1
15 TABLET ORAL DAILY
Qty: 90 TAB | Refills: 0 | Status: SHIPPED | OUTPATIENT
Start: 2018-11-05 | End: 2019-02-01 | Stop reason: SDUPTHER

## 2018-11-05 RX ORDER — GABAPENTIN 100 MG/1
CAPSULE ORAL
Qty: 90 CAP | Refills: 1 | Status: SHIPPED | OUTPATIENT
Start: 2018-11-05 | End: 2018-12-03 | Stop reason: SDUPTHER

## 2018-11-05 ASSESSMENT — PATIENT HEALTH QUESTIONNAIRE - PHQ9
5. POOR APPETITE OR OVEREATING: 2 - MORE THAN HALF THE DAYS
CLINICAL INTERPRETATION OF PHQ2 SCORE: 2
SUM OF ALL RESPONSES TO PHQ QUESTIONS 1-9: 12

## 2018-11-05 NOTE — PROGRESS NOTES
CC: Establish care    HPI:     Michelle Shaw is a 53 y.o. female, new patient to the clinic, presents to establish care. Pt has the following concerns:     Patient is a healthy, no major medical or surgical history.  Patient is postmenopausal and complains of nocturnal hot flashes that interfere with sleep and daytime productivity.  Patient has not tried any medication for her symptoms.  Patient also had history of left rotator cuff tear in January 2018.  Patient tried physical therapy and conservative management without relief.  She proceeded with left shoulder arthroscopy in July 2018 in [facility].  Patient is working with physical therapy.  Patient is taking Tylenol as needed for pain, but states that this does not control her symptoms.  She had good days and bad days.  Overall, there is minor improvement with the surgery.  Patient is right-handed.    Patient smokes 0.5 pack/day for the past 50 years.  She quit smoking during pregnancy and has tried Wellbutrin for smoking cessation in the past.  However, ultimately, she is not able to quit tobacco as her  continues to smoke every day.  Patient denies any active respiratory symptoms today.  Patient is planning to discuss with her  regarding smoking cessation.  She will contact my office when she is ready to discuss smoking cessation.    Patient also complains of complete hearing loss out of her left ear since Friday.  Patient tried to blow her nose, but symptoms failed to improve.  She complains of mild nasal congestion but denies fever, chills, sneezing, sore throat, cough, headaches, ear bleeding/discharge.    Current medicines (including changes today)  Current Outpatient Prescriptions   Medication Sig Dispense Refill   • gabapentin (NEURONTIN) 100 MG Cap Take 1-2 tablets before bed time for hot flashes 90 Cap 1   • meloxicam (MOBIC) 15 MG tablet Take 1 Tab by mouth every day. With foods 90 Tab 0   • acetaminophen (TYLENOL) 325 MG Tab Take 2 Tabs by  "mouth every 6 hours as needed. 30 Tab 1     No current facility-administered medications for this visit.      She  has a past medical history of Gestational diabetes.  She  has a past surgical history that includes acl repair (Bilateral); shoulder arthroscopy (Left, 2018); appendectomy; tubal coagulation laparoscopic bilateral; and primary c section.  Social History   Substance Use Topics   • Smoking status: Current Every Day Smoker     Packs/day: 0.50     Years: 30.00   • Smokeless tobacco: Never Used      Comment: 10 ciggs a day    • Alcohol use Yes      Comment: occass     Social History     Social History Narrative   • No narrative on file     Family History   Problem Relation Age of Onset   • Lung Disease Mother    • Heart Disease Mother    • Heart Disease Father    • Diabetes Father    • Hyperlipidemia Father    • No Known Problems Brother    • No Known Problems Brother      Family Status   Relation Status   • Mo Alive   • Fa Alive   • Bro Alive   • Bro Alive       I personally reviewed patient's problem list, allergies, medications, family hx, social hx with patient and update EPIC.     REVIEW OF SYSTEMS:  CONSTITUTIONAL:  Denies night sweats, fatigue, malaise, lethargy, fever or chills.  RESPIRATORY:  Denies cough, wheeze, hemoptysis, or shortness of breath.  CARDIOVASCULAR:  Denies chest pains, palpitations, pedal edema  GASTROINTESTINAL:  Denies abdominal pain, nausea or vomiting, diarrhea, constipation, hematemesis, hematochezia, melena.  GENITOURINARY:  Denies urinary urgency, frequency, dysuria, or hematuria.  No obstructive symptoms.  Denies unusual discharge.    All other systems reviewed and are negative     Objective:     Blood pressure 124/68, pulse 86, temperature 37 °C (98.6 °F), temperature source Temporal, height 1.6 m (5' 3\"), weight 52.6 kg (116 lb), SpO2 90 %. Body mass index is 20.55 kg/m².  Physical Exam:    Constitutional: Awake, alert, in no apparent distress.  Skin: Warm, dry, good " turgor, no rashes/jaundice in visible areas.  Eye: PERRL, intact EOM, conjunctiva clear, lids normal.  ENMT: Cerumen impaction with 100% blockage of left ear canal, right ear exam within normal limits.  Nasal & oral mucosa wnl, lips without lesions, good dentition, oropharynx clear.  Neck: Trachea midline, no masses, no thyromegaly. No cervical or supraclavicular lymphadenopathy.  Respiratory: Unlabored respiratory effort, lungs clear to auscultation, no wheezes, no rales.  Cardiovascular: Normal S1, S2, no murmur, no rubs, no gallops, no pedal edema.  Psych: Alert and oriented x3, affect and mood wnl, intact judgement and insight.       Assessment and Plan:   The following treatment plan was discussed    1. Hot flashes  -Trial gabapentin (NEURONTIN) 100 MG Cap; Take 1-2 tablets before bed time for hot flashes  Dispense: 90 Cap; Refill: 1  -Side effects of gabapentin discussed with patient    2. Injury of left rotator cuff, sequela  History of left rotator cuff injury (supraspinatus), status post left shoulder arthroscopy, rotator cuff repair, and extensive debridement in [facility] in July 2018.  Working at physical therapy, pain is partially controlled with Tylenol.  Patient still have good and bad days.  Plans:  -Continue to work with physical therapy and follow-up with orthopedic surgeon as directed.  -Continue Tylenol for pain  -Add meloxicam (MOBIC) 15 MG tablet; Take 1 Tab by mouth every day. With foods  Dispense: 90 Tab; Refill: 0  -Follow-up as needed    3. Tobacco use  Smokes 0.5 pack/day for the past 30 years with small intervals of unsuccessful smoking cessation.  Patient also did not smoke during pregnancies.  She tried Wellbutrin for smoking cessation in the past without success.  She did not try Chantix due to high cost.  Patient is interested in smoking cessation, but not ready.  She needs to convince her  to quit at the same time to ensure success.  Patient will talk to her  and get back to  me when she is ready.  She denies any active respiratory symptoms today. Plans:   - I discussed risks and health complications of chronic tobacco abuse. Recommended tobacco cessation. I offered pharmacotherapy for smoking cessation if needed.     4. Impacted cerumen of left ear  Exam is consistent with complete left ear canal blockage from cerumen impaction.  Plans:  -Ear lavage, see procedure note below    5. Colon cancer screening  - OCCULT BLOOD FECES IMMUNOASSAY (FIT); Future    6. Encounter for screening mammogram for breast cancer  - MA-SCREEN MAMMO W/CAD-BILAT; Future    7. Need for vaccination  - Influenza Vaccine Quad Injection >3Y (PF)  - Pneumococal Polysaccharide Vaccine 23-Valent =>1YO SQ/IM  - Tdap Vaccine =>6YO IM    Procedure note: ear wax removal.   left ear is partially irrigated by MA. I personally removed the rest of ear wax using a lighted curette pt tolerated the procedure well, no immediate complication noted.     -We [place] [facility] [place] [facility]  -Patient had Pap smear done in [facility] last year, normal, does not remember the names of the provider, does not have medical record with her.    Cherelle Witt M.D.    Records requested.  Followup: Return for As needed.    Please note that this dictation was created using voice recognition software. I have made every reasonable attempt to correct obvious errors, but I expect that there are errors of grammar and possibly content that I did not discover before finalizing the note.

## 2018-11-05 NOTE — LETTER
[facility]  Cherelle Witt M.D.  [address]   [place] [address]  Fax: [phone]   Authorization for Release/Disclosure of   Protected Health Information   Name: MICHELLE SHAW : 1965 SSN: [id number]   Address: [address]  [place] [address] Phone:    [phone] (home)    I authorize the entity listed below to release/disclose the PHI below to:   [facility]/Cherelle Witt M.D. and Cherelle Witt M.D.   Provider or Entity Name:  [facility]    Address   City, State, [facility]  Phone:      Fax:     Reason for request: continuity of care   Information to be released:    [  ] LAST COLONOSCOPY,  including any PATH REPORT and follow-up  [  ] LAST FIT/COLOGUARD RESULT [  ] LAST DEXA  [ x ] LAST MAMMOGRAM  [  ] LAST PAP  [  ] LAST LABS [  ] RETINA EXAM REPORT  [  ] IMMUNIZATION RECORDS  [  ] Release all info      [  ] Check here and initial the line next to each item to release ALL health information INCLUDING  _____ Care and treatment for drug and / or alcohol abuse  _____ HIV testing, infection status, or AIDS  _____ Genetic Testing    DATES OF SERVICE OR TIME PERIOD TO BE DISCLOSED: _____________  I understand and acknowledge that:  * This Authorization may be revoked at any time by you in writing, except if your health information has already been used or disclosed.  * Your health information that will be used or disclosed as a result of you signing this authorization could be re-disclosed by the recipient. If this occurs, your re-disclosed health information may no longer be protected by State or Federal laws.  * You may refuse to sign this Authorization. Your refusal will not affect your ability to obtain treatment.  * This Authorization becomes effective upon signing and will  on (date) __________.      If no date is indicated, this Authorization will  one (1) year from the signature date.    Name: Michelle Shaw    Signature:   Date:     2018       PLEASE FAX REQUESTED  RECORDS BACK TO: [phone]

## 2018-11-05 NOTE — TELEPHONE ENCOUNTER
Called patient and notified and she let me know that on her last visit with [facility] with Dr. Hamilton that they can not do anything else for her. As well as her PT told her there is nothing else they can help with. She would like to know what else can be done? Is there anywhere else that can see her for ortho that is not a surgeon. She doesn't know where to go to or what to do to get help

## 2018-11-05 NOTE — OP THERAPY DAILY TREATMENT
"  Outpatient Physical Therapy  DAILY TREATMENT     [facility] Physical Therapy [address].  [address]  [place] [address]  Phone:  [phone]  Fax:  [phone]    Date: 11/05/2018    Patient: Michelle Shaw  YOB: 1965  MRN: [id number]     Time Calculation  Start time: 0800  Stop time: 0851 Time Calculation (min): 51 minutes     Chief Complaint: Shoulder Problem    Visit #: 11    SUBJECTIVE:  Finds that the pain education helps control the pain levels.  Relying more on movement and less on pain meds.     OBJECTIVE:      Therapeutic Exercises (CPT 85498):     1. UBE , 5 min alt    2. Pulleys HBB, x 2 min    3. Roller, alt GHJ flex, jignesh, pec stretch, L median and ulnar nerve glides.     4. Prone angels, x 10, able to reach to full height= to R    5. Quad scap circles, x 20 ea way    6. Kentrell pose 3 way, x 30\" ea    7. Scaption , 2# x 10    8. OH press, 2# x 10    9. TB pull apart at 90 deg, L1 x 15    14. UPOC 11/13/18    Therapeutic Treatments and Modalities:     1. E Stim Unattended (CPT 90271), IFC and MH to L shoulder x 15 min in supine.     2. Manual Therapy (CPT 00214), L shoulder pec minor release, manual nerve glides for median and ulnar.  L R1 mobilization    Time-based treatments/modalities:  Manual therapy minutes (CPT 34727): 10 minutes  Therapeutic exercise minutes (CPT 14644): 20 minutes       ASSESSMENT:   Response to treatment: Continues to have pec minor overtension and restricted ulnar mobility, but improves with manual and therex.  Able to reach HBH to C5 after tx today.     PLAN/RECOMMENDATIONS:   Plan for treatment: therapy treatment to continue next visit.  Planned interventions for next visit: continue with current treatment. Decrease freq to 1x/week as function is improving and this will decrease financial burden.       "

## 2018-11-06 NOTE — TELEPHONE ENCOUNTER
1. Caller Name: Michelle Shaw                                           Call Back Number: [phone] (home)         Pt called and wondering if she can get a referral for her mental health. Wasn't sure where to send the referral to?    Please advise

## 2018-11-07 ENCOUNTER — APPOINTMENT (OUTPATIENT)
Dept: PHYSICAL THERAPY | Facility: REHABILITATION | Age: 53
End: 2018-11-07
Attending: ORTHOPAEDIC SURGERY
Payer: COMMERCIAL

## 2018-11-09 NOTE — TELEPHONE ENCOUNTER
I can try sending another referral for her to see a different Ortho for a second opinion. Most orthopedic physicians are surgeons as well. Otherwise, she could see a Chiropractor. I don't have much experience with them but its an option. Let me know what she wants to do. Thanks.

## 2018-11-12 ENCOUNTER — APPOINTMENT (OUTPATIENT)
Dept: PHYSICAL THERAPY | Facility: REHABILITATION | Age: 53
End: 2018-11-12
Attending: ORTHOPAEDIC SURGERY
Payer: COMMERCIAL

## 2018-11-12 NOTE — TELEPHONE ENCOUNTER
Called patient and notified.     Also I saw patient had a new Doctor for her primary.  I asked her if she had establish with someone else and she stated she saw someone in November because we were not available.  I explained to patient to call insurance to make sure we are her PCP still if not she will have to re-establish again with Dr. Morris.

## 2018-11-13 ENCOUNTER — APPOINTMENT (OUTPATIENT)
Dept: PHYSICAL THERAPY | Facility: REHABILITATION | Age: 53
End: 2018-11-13
Attending: ORTHOPAEDIC SURGERY
Payer: COMMERCIAL

## 2018-11-13 ENCOUNTER — HOSPITAL ENCOUNTER (OUTPATIENT)
Dept: LAB | Facility: MEDICAL CENTER | Age: 53
End: 2018-11-13
Attending: FAMILY MEDICINE
Payer: COMMERCIAL

## 2018-11-13 DIAGNOSIS — Z00.00 PE (PHYSICAL EXAM), ANNUAL: ICD-10-CM

## 2018-11-13 LAB
25(OH)D3 SERPL-MCNC: 27 NG/ML (ref 30–100)
ALBUMIN SERPL BCP-MCNC: 4.4 G/DL (ref 3.2–4.9)
ALBUMIN/GLOB SERPL: 1.5 G/DL
ALP SERPL-CCNC: 71 U/L (ref 30–99)
ALT SERPL-CCNC: 18 U/L (ref 2–50)
ANION GAP SERPL CALC-SCNC: 6 MMOL/L (ref 0–11.9)
AST SERPL-CCNC: 16 U/L (ref 12–45)
BASOPHILS # BLD AUTO: 0.9 % (ref 0–1.8)
BASOPHILS # BLD: 0.08 K/UL (ref 0–0.12)
BILIRUB SERPL-MCNC: 0.3 MG/DL (ref 0.1–1.5)
BUN SERPL-MCNC: 20 MG/DL (ref 8–22)
CALCIUM SERPL-MCNC: 9.9 MG/DL (ref 8.5–10.5)
CHLORIDE SERPL-SCNC: 105 MMOL/L (ref 96–112)
CHOLEST SERPL-MCNC: 177 MG/DL (ref 100–199)
CO2 SERPL-SCNC: 30 MMOL/L (ref 20–33)
CREAT SERPL-MCNC: 0.61 MG/DL (ref 0.5–1.4)
EOSINOPHIL # BLD AUTO: 0.17 K/UL (ref 0–0.51)
EOSINOPHIL NFR BLD: 1.9 % (ref 0–6.9)
ERYTHROCYTE [DISTWIDTH] IN BLOOD BY AUTOMATED COUNT: 40.3 FL (ref 35.9–50)
EST. AVERAGE GLUCOSE BLD GHB EST-MCNC: 140 MG/DL
FASTING STATUS PATIENT QL REPORTED: NORMAL
GLOBULIN SER CALC-MCNC: 2.9 G/DL (ref 1.9–3.5)
GLUCOSE SERPL-MCNC: 96 MG/DL (ref 65–99)
HBA1C MFR BLD: 6.5 % (ref 0–5.6)
HCT VFR BLD AUTO: 44 % (ref 37–47)
HDLC SERPL-MCNC: 45 MG/DL
HGB BLD-MCNC: 14.1 G/DL (ref 12–16)
IMM GRANULOCYTES # BLD AUTO: 0.1 K/UL (ref 0–0.11)
IMM GRANULOCYTES NFR BLD AUTO: 1.1 % (ref 0–0.9)
LDLC SERPL CALC-MCNC: 107 MG/DL
LYMPHOCYTES # BLD AUTO: 2.56 K/UL (ref 1–4.8)
LYMPHOCYTES NFR BLD: 27.9 % (ref 22–41)
MCH RBC QN AUTO: 28.9 PG (ref 27–33)
MCHC RBC AUTO-ENTMCNC: 32 G/DL (ref 33.6–35)
MCV RBC AUTO: 90.2 FL (ref 81.4–97.8)
MONOCYTES # BLD AUTO: 0.69 K/UL (ref 0–0.85)
MONOCYTES NFR BLD AUTO: 7.5 % (ref 0–13.4)
NEUTROPHILS # BLD AUTO: 5.56 K/UL (ref 2–7.15)
NEUTROPHILS NFR BLD: 60.7 % (ref 44–72)
NRBC # BLD AUTO: 0 K/UL
NRBC BLD-RTO: 0 /100 WBC
PLATELET # BLD AUTO: 263 K/UL (ref 164–446)
PMV BLD AUTO: 9.4 FL (ref 9–12.9)
POTASSIUM SERPL-SCNC: 4.2 MMOL/L (ref 3.6–5.5)
PROT SERPL-MCNC: 7.3 G/DL (ref 6–8.2)
RBC # BLD AUTO: 4.88 M/UL (ref 4.2–5.4)
SODIUM SERPL-SCNC: 141 MMOL/L (ref 135–145)
TRIGL SERPL-MCNC: 127 MG/DL (ref 0–149)
WBC # BLD AUTO: 9.2 K/UL (ref 4.8–10.8)

## 2018-11-13 PROCEDURE — 80053 COMPREHEN METABOLIC PANEL: CPT

## 2018-11-13 PROCEDURE — 82306 VITAMIN D 25 HYDROXY: CPT

## 2018-11-13 PROCEDURE — 36415 COLL VENOUS BLD VENIPUNCTURE: CPT

## 2018-11-13 PROCEDURE — 80061 LIPID PANEL: CPT

## 2018-11-13 PROCEDURE — 85025 COMPLETE CBC W/AUTO DIFF WBC: CPT

## 2018-11-13 PROCEDURE — 83036 HEMOGLOBIN GLYCOSYLATED A1C: CPT

## 2018-11-14 ENCOUNTER — HOSPITAL ENCOUNTER (OUTPATIENT)
Facility: MEDICAL CENTER | Age: 53
End: 2018-11-14
Attending: FAMILY MEDICINE
Payer: COMMERCIAL

## 2018-11-14 PROBLEM — E11.9 TYPE 2 DIABETES MELLITUS WITHOUT COMPLICATION, WITHOUT LONG-TERM CURRENT USE OF INSULIN (HCC): Status: ACTIVE | Noted: 2018-11-14

## 2018-11-14 PROBLEM — E78.00 PURE HYPERCHOLESTEROLEMIA: Status: ACTIVE | Noted: 2018-11-14

## 2018-11-14 PROBLEM — E55.9 VITAMIN D INSUFFICIENCY: Status: ACTIVE | Noted: 2018-11-14

## 2018-11-14 PROCEDURE — 82274 ASSAY TEST FOR BLOOD FECAL: CPT

## 2018-11-19 ENCOUNTER — PHYSICAL THERAPY (OUTPATIENT)
Dept: PHYSICAL THERAPY | Facility: REHABILITATION | Age: 53
End: 2018-11-19
Attending: ORTHOPAEDIC SURGERY
Payer: COMMERCIAL

## 2018-11-19 DIAGNOSIS — M25.512 CHRONIC LEFT SHOULDER PAIN: ICD-10-CM

## 2018-11-19 DIAGNOSIS — M75.122 COMPLETE ROTATOR CUFF TEAR OR RUPTURE OF LEFT SHOULDER, NOT SPECIFIED AS TRAUMATIC: ICD-10-CM

## 2018-11-19 DIAGNOSIS — G89.29 CHRONIC LEFT SHOULDER PAIN: ICD-10-CM

## 2018-11-19 PROCEDURE — 97140 MANUAL THERAPY 1/> REGIONS: CPT

## 2018-11-19 PROCEDURE — 97110 THERAPEUTIC EXERCISES: CPT

## 2018-11-19 NOTE — OP THERAPY PROGRESS SUMMARY
"  Outpatient Physical Therapy  PROGRESS SUMMARY NOTE      [facility] [address].  [address]  [place] [address]  Phone:  [phone]  Fax:  [phone]    Date of Visit: 11/19/2018    Patient: Michelle Shaw  YOB: 1965  MRN: [id number]     Referring Provider: Chandu Hamilton M.D.  [address] [place] [address]   Referring Diagnosis Complete rotator cuff tear or rupture of left shoulder, not specified as traumatic [M75.122];Pain in left shoulder [M25.512]     Visit Diagnoses     ICD-10-CM   1. Chronic left shoulder pain M25.512    G89.29   2. Complete rotator cuff tear or rupture of left shoulder, not specified as traumatic M75.122       Rehab Potential: good    Physical Therapy Occurrence Codes    Date physical therapy care plan established or reviewed:  10/2/18   Date physical therapy treatment started:  10/2/18          Cert Period: 11/19/18 to 12/31/18  Progress Report Period: 10/2/18-11/19/18    Functional Limitation G-Codes and Severity Modifiers  Quickdash General Total Score: 25       Objective Findings and Assessment:   Patient progression towards goals: Ms. Shaw has been seen for 12 PT sessions treating her L shoulder pain post-operatively.  Overall her progress has been good.  She was initially limited by high pain behavior and neural signs in the L UE from compensatory fwd shoulder position. She now demonstrates decrease disability on the DASH from 85% to 25%, AROM to WNL except for HBB, strength WNL to side and fair over shoulder height.  Her complaint at this time is only weakness and UE \"shaking\" with any task requiring load.  Still avoids using the L UE due to fear of \"breaking something again.\"  Pt would benefit from continued PT services at decreased frequency to finalize strength program and higher level function.       Goals:   Short Term Goals:   - Improve L shoulder ER strength to 4/5 at 90 deg abd  - Able to reach HBB to L1  Short term goal " time span:  2-4 weeks      Long Term Goals:    - Improve DASH to less than 20%  - Indep with HEP for continued strengthening.   Long term goal time span:  4-6 weeks    Plan:   Planned therapy interventions:  Functional Training, Self Care (CPT 85992), E Stim Unattended (CPT 57426), Hot or Cold Pack Therapy (CPT 85517), Manual Therapy (CPT 75079), Neuromuscular Re-education (CPT 46977), Therapeutic Exercise (CPT 04395) and Therapeutic Activities (CPT 44229)  Frequency:  1x week  Duration in weeks:  6        Referring provider co-signature:  I have reviewed this plan of care and my co-signature certifies the need for services.    Physician Signature: ________________________________ Date: ______________

## 2018-11-20 ENCOUNTER — HOSPITAL ENCOUNTER (OUTPATIENT)
Facility: MEDICAL CENTER | Age: 53
End: 2018-11-20
Attending: FAMILY MEDICINE
Payer: COMMERCIAL

## 2018-11-20 ENCOUNTER — OFFICE VISIT (OUTPATIENT)
Dept: MEDICAL GROUP | Age: 53
End: 2018-11-20
Payer: COMMERCIAL

## 2018-11-20 VITALS
OXYGEN SATURATION: 90 % | HEIGHT: 63 IN | WEIGHT: 117 LBS | DIASTOLIC BLOOD PRESSURE: 82 MMHG | BODY MASS INDEX: 20.73 KG/M2 | SYSTOLIC BLOOD PRESSURE: 126 MMHG | TEMPERATURE: 97.9 F | HEART RATE: 93 BPM

## 2018-11-20 DIAGNOSIS — E55.9 VITAMIN D INSUFFICIENCY: ICD-10-CM

## 2018-11-20 DIAGNOSIS — E11.9 TYPE 2 DIABETES MELLITUS WITHOUT COMPLICATION, WITHOUT LONG-TERM CURRENT USE OF INSULIN (HCC): ICD-10-CM

## 2018-11-20 DIAGNOSIS — Z23 NEED FOR HEPATITIS B VACCINATION: ICD-10-CM

## 2018-11-20 DIAGNOSIS — Z00.00 PE (PHYSICAL EXAM), ANNUAL: Primary | ICD-10-CM

## 2018-11-20 DIAGNOSIS — Z72.0 TOBACCO USE: ICD-10-CM

## 2018-11-20 DIAGNOSIS — Z12.11 COLON CANCER SCREENING: ICD-10-CM

## 2018-11-20 DIAGNOSIS — R23.2 HOT FLASHES: ICD-10-CM

## 2018-11-20 DIAGNOSIS — E78.00 PURE HYPERCHOLESTEROLEMIA: ICD-10-CM

## 2018-11-20 DIAGNOSIS — S46.002S INJURY OF LEFT ROTATOR CUFF, SEQUELA: ICD-10-CM

## 2018-11-20 LAB
HEMOCCULT STL QL IA: NEGATIVE
RETINAL SCREEN: NEGATIVE

## 2018-11-20 PROCEDURE — 99396 PREV VISIT EST AGE 40-64: CPT | Mod: 25 | Performed by: FAMILY MEDICINE

## 2018-11-20 PROCEDURE — 92250 FUNDUS PHOTOGRAPHY W/I&R: CPT | Mod: TC | Performed by: FAMILY MEDICINE

## 2018-11-20 PROCEDURE — 90471 IMMUNIZATION ADMIN: CPT | Performed by: FAMILY MEDICINE

## 2018-11-20 PROCEDURE — 82570 ASSAY OF URINE CREATININE: CPT

## 2018-11-20 PROCEDURE — 82043 UR ALBUMIN QUANTITATIVE: CPT

## 2018-11-20 PROCEDURE — 90746 HEPB VACCINE 3 DOSE ADULT IM: CPT | Performed by: FAMILY MEDICINE

## 2018-11-20 RX ORDER — METFORMIN HYDROCHLORIDE 500 MG/1
500 TABLET, EXTENDED RELEASE ORAL 2 TIMES DAILY
Qty: 180 TAB | Refills: 1 | Status: SHIPPED | OUTPATIENT
Start: 2018-11-20 | End: 2019-01-11 | Stop reason: SDUPTHER

## 2018-11-20 RX ORDER — PRAVASTATIN SODIUM 10 MG
10 TABLET ORAL
Qty: 90 TAB | Refills: 1 | Status: SHIPPED | OUTPATIENT
Start: 2018-11-20 | End: 2019-03-01

## 2018-11-20 NOTE — PROGRESS NOTES
Subjective:   CC: annual PE    HPI:     Michelle Shaw is a 53 y.o. female who presents for lab review. Her recent blood tests were notable for A1C of 6.5. Pt had hx of gestational DM. She also has familial hx of type 2 DM. She denies polyuria, polydipsia, unexplained weight loss.   The patient complains of chronic left shoulder for quite some time. This is a work related injury. She is s/p arthroscopy in 7/18 in [place]. She also worked with physical therapy for her left shoulder. At previous OV, she c/o persistent shoulder pain and weakness. She was referred to ortho and PT, but her cases were both rejected by ortho and PT. She was told that there is nothing that they can do for her. Today she c/o of persistent left shoulder weakness leading to inability to lift heavy objects. She works for a local [employer]. The nature of her employment requires heavy lifting. She is therefore put on disability. The patient denies any recent left shoulder trauma or injury.     Pt is taking Gabapentin for hot flashes, which appears to help with symptoms and allow her to sleep better at night  Pt has hx of chronic tobacco abuse. She continues to smoke approx 0.5 ppd x 50 years. She is not ready to quit, but is planning to cut back. She denies any active respiratory symptoms today.        Current medicines (including changes today)  Current Outpatient Prescriptions   Medication Sig Dispense Refill   • metFORMIN ER (GLUCOPHAGE XR) 500 MG TABLET SR 24 HR Take 1 Tab by mouth 2 times a day. 180 Tab 1   • pravastatin (PRAVACHOL) 10 MG Tab Take 1 Tab by mouth every bedtime. 90 Tab 1   • gabapentin (NEURONTIN) 100 MG Cap Take 1-2 tablets before bed time for hot flashes 90 Cap 1   • meloxicam (MOBIC) 15 MG tablet Take 1 Tab by mouth every day. With foods 90 Tab 0   • acetaminophen (TYLENOL) 325 MG Tab Take 2 Tabs by mouth every 6 hours as needed. 30 Tab 1     No current facility-administered medications for this visit.      She  has a  "past medical history of Gestational diabetes.    I personally reviewed patient's problem list, allergies, medications, family hx, social hx with patient and update EPIC.     REVIEW OF SYSTEMS:  CONSTITUTIONAL:  Denies night sweats, fatigue, malaise, lethargy, fever or chills.  RESPIRATORY:  Denies cough, wheeze, hemoptysis, or shortness of breath.  CARDIOVASCULAR:  Denies chest pains, palpitations, pedal edema       Objective:     Blood pressure 126/82, pulse 93, temperature 36.6 °C (97.9 °F), temperature source Temporal, height 1.6 m (5' 2.99\"), weight 53.1 kg (117 lb), SpO2 90 %. Body mass index is 20.73 kg/m².    Physical Exam:  Constitutional: awake, alert, in no distress.  Skin: Warm, dry, good turgor, no rashes, bruises, ulcers in visible areas.  Eye: conjunctiva clear, lids neg for edema or lesions.  ENMT: TM and auditory canals wnl. Oral and nasal mucosa wnl. Lips without lesions, good dentition, oropharynx clear.  Neck: Trachea midline, no masses, no thyromegaly. No cervical or supraclavicular lymphadenopathy  Respiratory: Unlabored respiratory effort, lungs clear to auscultation, no wheezes, no rales.  Cardiovascular: Normal S1, S2, no murmur, no pedal edema.  Psych: Oriented x3, affect and mood wnl, intact judgement and insight    Monofilament testing with a 10 gram force: overall sensation to light touch is decreased bilaterally.   Visual Inspection: Feet without maceration, ulcers, fissures.  Pedal pulses: intact bilaterally       Assessment and Plan:   The following treatment plan was discussed      1. Type 2 diabetes mellitus without complication, without long-term current use of insulin ([facility])  2. History of gestational diabetes  New diagnosis, A1C is 6.5. Plans:   - start metFORMIN ER (GLUCOPHAGE XR) 500 MG TABLET SR 24 HR; Take 1 Tab by mouth 2 times a day.  Dispense: 180 Tab; Refill: 1  - POCT Retinal Eye Exam  - Diabetic Monofilament Lower Extremity Exam  - MICROALBUMIN CREAT RATIO URINE (CLINIC " "COLLECT)  - Discussed dietary modification, exercise, weight loss  - Annual eye exam  - Regular foot exam  - Discussed vaccines recommendations: PPSV 23 and hepatitis B.   - Side effects of all medications discussed with patient  - Follow up in 3 months.     3. Injury of left rotator cuff, sequela  Chronic, workman comp case, s/p left shoulder arthroscopy and PT, continues to have persistent pain and weakness, currently on disability as she is not able to lift heavy objections required by her employment. Both PT and ortho declined working as \"there nothing more that they can do for her at this time\".   - continue home rehab exercise, focus on strengthening exercise.   - OTC as needed for pain    4. Pure hypercholesterolemia  Chronic, mild, will start low dose Pravachol given hx of DM. Plans:   - pravastatin (PRAVACHOL) 10 MG Tab; Take 1 Tab by mouth every bedtime.  Dispense: 90 Tab; Refill: 1  - lifestyle modification recommended.     5. Tobacco use  Chronic, smokes 0.5 ppd x 50 years, not interested in quitting at this time, denies any active respiratory symptoms now. Plans:   - I discussed risks and health complications of chronic tobacco abuse. Recommended tobacco cessation. I offered pharmacotherapy for smoking cessation if needed.      6. Hot flashes  Chronic, secondary to menopause, improving with Gabapentin, will continue.     7. Vitamin D insufficiency  - 2000 units of vitamin D daily    8. Need for hepatitis B vaccination  - Hepatitis B Vaccine Adult IM    9. PE (physical exam), annual  - pt is counseled on diet, exercise, vitamin supplement, mental health, sleep, stress  - discussed screening guidelines for pap, STD, mammogram, colonoscopy and vaccine recommendations.         Cherelle Witt M.D.    Followup: Return in about 3 months (around 2/20/2019) for Diabetes, Pap.    Please note that this dictation was created using voice recognition software and/or scribes. I have made every reasonable attempt to " correct obvious errors, but I expect that there are errors of grammar and possibly content that I did not discover before finalizing the note.     I, Chandu Houston (Scribe), am scribing for, and in the presence of, Cherelle Witt M.D.    Electronically signed by: Chandu Houston ([place]), 11/20/2018    ICherelle M.D. personally performed the services described in this documentation, as scribed by Chandu Houston in my presence, and it is both accurate and complete.

## 2018-11-21 LAB
CREAT UR-MCNC: 43 MG/DL
MICROALBUMIN UR-MCNC: <0.7 MG/DL
MICROALBUMIN/CREAT UR: NORMAL MG/G (ref 0–30)

## 2018-11-23 PROBLEM — Z86.32 HISTORY OF GESTATIONAL DIABETES: Status: RESOLVED | Noted: 2018-08-23 | Resolved: 2018-11-23

## 2018-11-27 ENCOUNTER — HOSPITAL ENCOUNTER (OUTPATIENT)
Dept: RADIOLOGY | Facility: MEDICAL CENTER | Age: 53
End: 2018-11-27
Attending: FAMILY MEDICINE
Payer: COMMERCIAL

## 2018-11-27 ENCOUNTER — PHYSICAL THERAPY (OUTPATIENT)
Dept: PHYSICAL THERAPY | Facility: REHABILITATION | Age: 53
End: 2018-11-27
Attending: ORTHOPAEDIC SURGERY
Payer: COMMERCIAL

## 2018-11-27 DIAGNOSIS — M75.122 COMPLETE ROTATOR CUFF TEAR OR RUPTURE OF LEFT SHOULDER, NOT SPECIFIED AS TRAUMATIC: ICD-10-CM

## 2018-11-27 DIAGNOSIS — G89.29 CHRONIC LEFT SHOULDER PAIN: ICD-10-CM

## 2018-11-27 DIAGNOSIS — Z12.31 ENCOUNTER FOR SCREENING MAMMOGRAM FOR BREAST CANCER: ICD-10-CM

## 2018-11-27 DIAGNOSIS — M25.512 CHRONIC LEFT SHOULDER PAIN: ICD-10-CM

## 2018-11-27 PROCEDURE — 77067 SCR MAMMO BI INCL CAD: CPT

## 2018-11-27 PROCEDURE — 97110 THERAPEUTIC EXERCISES: CPT

## 2018-11-27 NOTE — OP THERAPY DAILY TREATMENT
Outpatient Physical Therapy  DAILY TREATMENT     [facility] Physical Therapy [address].  [address]  [place] [address]  Phone:  [phone]  Fax:  [phone]    Date: 11/27/2018    Patient: Michelle Shaw  YOB: 1965  MRN: [id number]     Time Calculation  Start time: 0925  Stop time: 1000 Time Calculation (min): 35 minutes     Chief Complaint: Shoulder Problem    Visit #: 13    SUBJECTIVE:  Has officially been dx'd with DM. Shoulder is about the same.  Continues to complain about weather/cold being a bother.     OBJECTIVE:        Therapeutic Exercises (CPT 56189):     1. UBE , 5 min alt    2. Pulleys HBB, x 2 min    3. Prone hughstons, 4 way x 10 ea    4. ER at 90 bilat (back to wall for extra feedback), No resistance x 10, L1 x 10    5. Horiz abd at 90 deg , L1 x 20    6. Scaption with L1 TB on wall, x 15.       Time-based treatments/modalities:  Therapeutic exercise minutes (CPT 43202): 35 minutes       ASSESSMENT:   Response to treatment: Indep now with scap setting in all positions, but lacking confidence in use overhead or in carrying objects greater than 5# with the L hand    PLAN/RECOMMENDATIONS:   Plan for treatment: therapy treatment to continue next visit.  Planned interventions for next visit: continue with current treatment. Cont functional strength, farmers carry

## 2018-12-03 ENCOUNTER — OFFICE VISIT (OUTPATIENT)
Dept: MEDICAL GROUP | Age: 53
End: 2018-12-03
Payer: COMMERCIAL

## 2018-12-03 VITALS
WEIGHT: 120 LBS | BODY MASS INDEX: 21.26 KG/M2 | DIASTOLIC BLOOD PRESSURE: 66 MMHG | SYSTOLIC BLOOD PRESSURE: 116 MMHG | OXYGEN SATURATION: 100 % | HEART RATE: 95 BPM | TEMPERATURE: 98.7 F | HEIGHT: 63 IN

## 2018-12-03 DIAGNOSIS — R23.2 HOT FLASHES: ICD-10-CM

## 2018-12-03 DIAGNOSIS — E78.00 PURE HYPERCHOLESTEROLEMIA: ICD-10-CM

## 2018-12-03 DIAGNOSIS — E11.9 TYPE 2 DIABETES MELLITUS WITHOUT COMPLICATION, WITHOUT LONG-TERM CURRENT USE OF INSULIN (HCC): ICD-10-CM

## 2018-12-03 DIAGNOSIS — R20.0 NUMBNESS AND TINGLING OF BOTH FEET: ICD-10-CM

## 2018-12-03 DIAGNOSIS — R20.2 NUMBNESS AND TINGLING OF BOTH FEET: ICD-10-CM

## 2018-12-03 PROCEDURE — 99214 OFFICE O/P EST MOD 30 MIN: CPT | Performed by: INTERNAL MEDICINE

## 2018-12-03 RX ORDER — GABAPENTIN 100 MG/1
100 CAPSULE ORAL 3 TIMES DAILY
Qty: 90 CAP | Refills: 1 | Status: SHIPPED | OUTPATIENT
Start: 2018-12-03 | End: 2019-01-31 | Stop reason: SDUPTHER

## 2018-12-03 ASSESSMENT — PAIN SCALES - GENERAL: PAINLEVEL: 8=MODERATE-SEVERE PAIN

## 2018-12-04 ENCOUNTER — PHYSICAL THERAPY (OUTPATIENT)
Dept: PHYSICAL THERAPY | Facility: REHABILITATION | Age: 53
End: 2018-12-04
Attending: ORTHOPAEDIC SURGERY
Payer: COMMERCIAL

## 2018-12-04 DIAGNOSIS — M75.122 COMPLETE ROTATOR CUFF TEAR OR RUPTURE OF LEFT SHOULDER, NOT SPECIFIED AS TRAUMATIC: ICD-10-CM

## 2018-12-04 DIAGNOSIS — G89.29 CHRONIC LEFT SHOULDER PAIN: ICD-10-CM

## 2018-12-04 DIAGNOSIS — M25.512 CHRONIC LEFT SHOULDER PAIN: ICD-10-CM

## 2018-12-04 PROCEDURE — 97530 THERAPEUTIC ACTIVITIES: CPT

## 2018-12-04 PROCEDURE — 97110 THERAPEUTIC EXERCISES: CPT

## 2018-12-04 NOTE — ASSESSMENT & PLAN NOTE
She is taking pravastatin 10 mg every evening.  She denies side effects from taking pravastatin.  She just recently started pravastatin 1-2 weeks ago.

## 2018-12-04 NOTE — OP THERAPY DISCHARGE SUMMARY
Outpatient Physical Therapy  DISCHARGE SUMMARY NOTE      [facility] Physical Therapy [address]  [address]  [place] [address]  Phone:  [phone]  Fax:  [phone]    Date of Visit: 12/04/2018    Patient: Michelle Shaw  YOB: 1965  MRN: [id number]     Referring Provider: Chandu Hamilton M.D.  [address] [place] [address]   Referring Diagnosis Complete rotator cuff tear or rupture of left shoulder, not specified as traumatic [M75.122];Complete rupture of rotator cuff [M75.120];Pain in left shoulder [M25.512]     Physical Therapy Occurrence Codes    Date physical therapy care plan established or reviewed:  10/2/18   Date physical therapy treatment started:  10/2/18          Functional Limitation G-Codes and Severity Modifiers  Quickdash General Total Score: 43.18     Your patient is being discharged from Physical Therapy with the following comments:   · Goals met    Comments:  Mrs. Shaw was seen for 14 PT visits treating her L shoulder post-operatively.  Initially, progress was slow due to high pain levels, but pt was very receptive to pain education and neural glides.  Her AROM and strength is now WNL. She is still shows fear avoidance to load her L UE when reaching fully overhead or to assist the R UE in carrying bags despite adequate strength.  She is slowly progressing this with HEP.  Now able to get dishes from top shelf and carry objects up to 10# in the L hand.  She has concerns about job requirements of holding a 20# box in L hand.  She is indep with a HEP to address this, but may be limited in her ability to return to previous occupation due to new development of DMII and peripheral neuropathy.     Limitations Remaining:  Shoulder AROM: ff= 150, abd= 150, HBB= TLJ, HBH= T1. Strength 5/5 tested to side, 4/5 in empty can, as well as ER at 90.  Pt is able to tolerate holding a 10# box in her L hand, but anterior shoulder pain is present for any object heavier.      Recommendations:  D/C to HEP.     Estella Dixon, PT, DPT    Date: 12/4/2018

## 2018-12-04 NOTE — ASSESSMENT & PLAN NOTE
This is a new patient to me and new problem to me.  Patient is due regular patient of Dr. Witt.  Patient reported having tingling and numbness on both feet and sharp pain on the balls of her feet and stated that symptoms started about 1 week.  She felt that she is walking on glasses due to the sharp pain when she walks.  She also feeling tingling and numbness on both feet and hands.  She started taking gabapentin 100 mg 1 capsule at nighttime 1 week ago and she did not have any side effects from taking it.  She was recently started on metformin 500 mg twice daily for diabetes.  We discussed to increase the dose of gabapentin to 100 mg 3 times a day and she agreed with the plan.

## 2018-12-04 NOTE — LETTER
December 4, 2018    Chandu Hamilton M.D.  [address]  [facility] [address]      Re:  Michelle Shaw          Dear Dr. Hamilton,    It was a pleasure seeing your patient, Michelle Shaw, on 12/4/2018 for her final therapy visit.     Please find enclosed a copy of the patient's discharge summary from outpatient physical therapy services.          On behalf of the staff at [facility], we would like to thank you for your referrals.  We appreciate your confidence in our clinic and will continue to work hard to provide the best outcomes for your patients.    We sincerely enjoy treating your patients and hope that you have been happy with their care.  Thank you again, and please call with any questions, concerns or ways that we can best meet your needs.        Sincerely,          Estella Dixon, PT, DPT    No Recipients              Outpatient Physical Therapy  DISCHARGE SUMMARY NOTE      [address]  [address]  [facility] [address]  Phone:  [phone]  Fax:  [phone]    Date of Visit: 12/04/2018    Patient: Michelle Shaw  YOB: 1965  MRN: [id number]     Referring Provider: Chandu Hamilton M.D.  [address] [place] [address]   Referring Diagnosis Complete rotator cuff tear or rupture of left shoulder, not specified as traumatic [M75.122];Complete rupture of rotator cuff [M75.120];Pain in left shoulder [M25.512]     Physical Therapy Occurrence Codes    Date physical therapy care plan established or reviewed:  10/2/18   Date physical therapy treatment started:  10/2/18          Functional Limitation G-Codes and Severity Modifiers  Quickdash General Total Score: 43.18     Your patient is being discharged from Physical Therapy with the following comments:   · Goals met    Comments:  Mrs. Shaw was seen for 14 PT visits treating her L shoulder post-operatively.  Initially, progress was slow due to high pain levels, but pt was very receptive to  pain education and neural glides.  Her AROM and strength is now WNL. She is still shows fear avoidance to load her L UE when reaching fully overhead or to assist the R UE in carrying bags despite adequate strength.  She is slowly progressing this with HEP.  Now able to get dishes from top shelf and carry objects up to 10# in the L hand.  She has concerns about job requirements of holding a 20# box in L hand.  She is indep with a HEP to address this, but may be limited in her ability to return to previous occupation due to new development of DMII and peripheral neuropathy.     Limitations Remaining:  Shoulder AROM: ff= 150, abd= 150, HBB= TLJ, HBH= T1. Strength 5/5 tested to side, 4/5 in empty can, as well as ER at 90.  Pt is able to tolerate holding a 10# box in her L hand, but anterior shoulder pain is present for any object heavier.     Recommendations:  D/C to HEP.     Estella Dixon, PT, DPT    Date: 12/4/2018

## 2018-12-04 NOTE — ASSESSMENT & PLAN NOTE
Patient is taking gabapentin 100 mg once a day at night for hot flashes.  Her symptom is well controlled currently with gabapentin.  We discussed to increase the dose of gabapentin to 100 mg 3 times a day for tingling and numbness sensation on both feet.  She agreed to try it.

## 2018-12-04 NOTE — ASSESSMENT & PLAN NOTE
Patient states that she started taking metformin 500 mg twice daily 1-2 weeks ago as prescribed by her PCP.  She initially had loose stool after taking metformin.  However her loose stool resolved and she tolerated Metformin without side effects currently.  She will continue metformin 500 mg twice a day with meals.

## 2018-12-04 NOTE — PROGRESS NOTES
Michelle Shaw is a 53 y.o. female here for evaluation and management of:      HPI:    Numbness and tingling of both feet  This is a new patient to me and new problem to me.  Patient is due regular patient of Dr. Witt.  Patient reported having tingling and numbness on both feet and sharp pain on the balls of her feet and stated that symptoms started about 1 week.  She felt that she is walking on glasses due to the sharp pain when she walks.  She also feeling tingling and numbness on both feet and hands.  She started taking gabapentin 100 mg 1 capsule at nighttime 1 week ago and she did not have any side effects from taking it.  She was recently started on metformin 500 mg twice daily for diabetes.  We discussed to increase the dose of gabapentin to 100 mg 3 times a day and she agreed with the plan.    Type 2 diabetes mellitus without complication, without long-term current use of insulin ([facility])  Patient states that she started taking metformin 500 mg twice daily 1-2 weeks ago as prescribed by her PCP.  She initially had loose stool after taking metformin.  However her loose stool resolved and she tolerated Metformin without side effects currently.  She will continue metformin 500 mg twice a day with meals.    Hot flashes  Patient is taking gabapentin 100 mg once a day at night for hot flashes.  Her symptom is well controlled currently with gabapentin.  We discussed to increase the dose of gabapentin to 100 mg 3 times a day for tingling and numbness sensation on both feet.  She agreed to try it.    Pure hypercholesterolemia  She is taking pravastatin 10 mg every evening.  She denies side effects from taking pravastatin.  She just recently started pravastatin 1-2 weeks ago.    Current medicines (including changes today)  Current Outpatient Prescriptions   Medication Sig Dispense Refill   • gabapentin (NEURONTIN) 100 MG Cap Take 1 Cap by mouth 3 times a day. 90 Cap 1   • metFORMIN ER (GLUCOPHAGE XR) 500 MG TABLET SR 24  HR Take 1 Tab by mouth 2 times a day. 180 Tab 1   • pravastatin (PRAVACHOL) 10 MG Tab Take 1 Tab by mouth every bedtime. 90 Tab 1   • meloxicam (MOBIC) 15 MG tablet Take 1 Tab by mouth every day. With foods 90 Tab 0   • acetaminophen (TYLENOL) 325 MG Tab Take 2 Tabs by mouth every 6 hours as needed. 30 Tab 1     No current facility-administered medications for this visit.      She  has a past medical history of Gestational diabetes.  She  has a past surgical history that includes acl repair (Bilateral); shoulder arthroscopy (Left, 2018); appendectomy; tubal coagulation laparoscopic bilateral; and primary c section.  Social History   Substance Use Topics   • Smoking status: Current Every Day Smoker     Packs/day: 0.50     Years: 30.00   • Smokeless tobacco: Never Used      Comment: 10 ciggs a day    • Alcohol use Yes      Comment: occass     Social History     Social History Narrative   • No narrative on file     Family History   Problem Relation Age of Onset   • Lung Disease Mother    • Heart Disease Mother    • Heart Disease Father    • Diabetes Father    • Hyperlipidemia Father    • No Known Problems Brother    • No Known Problems Brother      Family Status   Relation Status   • Mo Alive   • Fa Alive   • Bro Alive   • Bro Alive     Health Maintenance Topics with due status: Overdue       Topic Date Due    PAP SMEAR 06/18/1987    IMM ZOSTER VACCINES 06/18/2016    MAMMOGRAM 06/08/2018         ROS    Gen.: Denied weight change, appetite change, fatigue.  ENT: Denied sinus tenderness, nasal congestion, runny nose, or sore throat  CVS: Denied chest pain, palpitations, legs swelling.  Respiratory: Denied cough, shortness of breath, wheezing.  GI: Denied abdominal pain, constipation or diarrhea.  Endocrine: Denied temperature intolerance, increased frequency of urination, polyphagia or polydipsia.  Musculoskeletal: Denied back pain or joint pain.    All other systems reviewed and are negative     Objective:     Blood  "pressure 116/66, pulse 95, temperature 37.1 °C (98.7 °F), temperature source Temporal, height 1.6 m (5' 2.99\"), weight 54.4 kg (120 lb), last menstrual period 12/03/2015, SpO2 100 %, not currently breastfeeding. Body mass index is 21.26 kg/m².  Physical Exam:    Constitutional: Well nourished and Well developed, Alert, no distress.  Skin: Warm, dry, good turgor, no rashes in visible areas.  Eye: Equal, round and reactive, conjunctiva clear, lids normal.  ENMT: Lips without lesions, good dentition, oropharynx clear.  Neck: Trachea midline, no masses, no thyromegaly. No cervical or supraclavicular lymphadenopathy.  Respiratory: Unlabored respiratory effort, lungs clear to auscultation, no wheezes, no ronchi.  Cardiovascular: Normal S1, S2, no murmur, no edema. No carotid bruits.  Abdomen: Soft, non distended, non-tender, no masses, no hepatosplenomegaly. Bowel sound normal.  Extremities: No edema, no clubbing, no cyanosis.  Psych: Alert and oriented x3, normal affect and mood.  Musculoskeletal exam: Mild tender on palpation of the bottom of the feet, no swelling or erythema.        Assessment and Plan:   The following treatment plan was discussed       1. Numbness and tingling of both feet  -Advised patient to increase the dose of gabapentin 100 mg from 1 capsule nightly to 1 capsule 3 times a day for tingling and numbness of both feet.  -Discussed with patient to do warm water foot spa, wearing gel cushion insert for shoes, stretching exercise regularly.  Encouraged to stop smoking.  She is taking meloxicam 15 mg daily as prescribed by her PCP for left rotator cuff pain.  I recommend patient not to take over-the-counter NSAIDs if she is taking meloxicam.  Patient is advised to take Tylenol 500 mg twice daily as needed to 3 times daily as needed.  -Patient is advised to return to clinic sooner if her symptoms do not improve.  - gabapentin (NEURONTIN) 100 MG Cap; Take 1 Cap by mouth 3 times a day.  Dispense: 90 Cap; " Refill: 1    2. Hot flashes  -Well-controlled.  Increase gabapentin from 100 mg nightly to 100 mg 3 times daily due to tingling and numbness on both feet.  Reviewed the use and side effects of gabapentin with patient.  She denies side effects from taking gabapentin currently.  - gabapentin (NEURONTIN) 100 MG Cap; Take 1 Cap by mouth 3 times a day.  Dispense: 90 Cap; Refill: 1    3. Type 2 diabetes mellitus without complication, without long-term current use of insulin ([facility])  -Patient had loose stool initially when she started taking metformin.  Loose stool resolved.  She does not have any discomfort from taking metformin currently.  Encourage patient to continue metformin 500 mg twice a day with meal.  Advised patient to follow-up with PCP as scheduled on 2/21/19.    4. Pure hypercholesterolemia  -LDL not at goal yet.  Patient will continue pravastatin 10 mg every evening.   -Advised to eat low fat, low carbohydrate and high fiber diet as well as do cardio physical exercise regularly.        Followup: Return in about 3 months (around 2/21/2019), or if symptoms worsen or fail to improve, for Diabetes, Hyperlipidemia, tingling and numbness of both feet. sooner should new symptoms or problems arise.      Please note that this dictation was created using voice recognition software. I have made every reasonable attempt to correct obvious errors, but I expect that there may have unintended errors in text, spelling, punctuation, or grammar that I did not discover.

## 2018-12-04 NOTE — OP THERAPY DAILY TREATMENT
Outpatient Physical Therapy  DAILY TREATMENT     [facility] Physical [address]  [address]  [place] [address]  Phone:  [phone]  Fax:  [phone]    Date: 12/04/2018    Patient: Michelle Shaw  YOB: 1965  MRN: [id number]     Time Calculation  Start time: 0950  Stop time: 1025 Time Calculation (min): 35 minutes     Chief Complaint: Shoulder Problem    Visit #: 14    SUBJECTIVE:  The neuropathy in the LEs and hands is very severe today.  Painful to walk. She continues to have f/u with MDs regarding this issue. Attention to shoulder has waned due to more pressing health concerns.     OBJECTIVE:  Current objective measures: Quickdash General Total Score: 43.18       Therapeutic Exercises (CPT 66448):     1. UBE, 5 min alt    2. Pulleys , x 3 min    Therapeutic Treatments and Modalities:     1. Therapeutic Activities (CPT 91021), Re-eval of L shoulder.  Discussing return to work tolerance.  Testing work specific movement: holding basket in L while R UE manipulates.     Time-based treatments/modalities:  Therapeutic exercise minutes (CPT 85071): 8 minutes  Therapeutic activity minutes (CPT 47584): 20 minutes       ASSESSMENT:   Response to treatment: Shoulder AROM: ff= 150, abd= 150, HBB= TLJ, HBH= T1. Strength 5/5 tested to side, 4/5 in empty can, as well as ER at 90.  Pt is able to tolerate holding a 10# box in her L hand, but anterior shoulder pain is present for any object heavier. Pt is indep with HEP.    PLAN/RECOMMENDATIONS:   Plan for treatment: no further treatment needed.

## 2018-12-06 DIAGNOSIS — R92.8 ABNORMAL MAMMOGRAM OF BOTH BREASTS: ICD-10-CM

## 2018-12-10 ENCOUNTER — HOSPITAL ENCOUNTER (OUTPATIENT)
Dept: RADIOLOGY | Facility: MEDICAL CENTER | Age: 53
End: 2018-12-10

## 2018-12-10 ENCOUNTER — TELEPHONE (OUTPATIENT)
Dept: MEDICAL GROUP | Age: 53
End: 2018-12-10

## 2018-12-10 NOTE — TELEPHONE ENCOUNTER
Michelle said that the breast center was waiting for the comparison before making any further decisions.

## 2018-12-10 NOTE — TELEPHONE ENCOUNTER
----- Message from Cherelle Witt M.D. sent at 12/6/2018 11:20 AM PST -----  Please call patient to make sure she receives my message regarding abnormal screening mammogram via my chart.  Please advise patient to call [phone] to schedule appointment for diagnostic mammogram and breast ultrasound.  Thank you

## 2018-12-31 ENCOUNTER — APPOINTMENT (OUTPATIENT)
Dept: RADIOLOGY | Facility: MEDICAL CENTER | Age: 53
End: 2018-12-31
Attending: FAMILY MEDICINE
Payer: COMMERCIAL

## 2019-01-10 ENCOUNTER — PATIENT MESSAGE (OUTPATIENT)
Dept: MEDICAL GROUP | Age: 54
End: 2019-01-10

## 2019-01-10 ENCOUNTER — TELEPHONE (OUTPATIENT)
Dept: MEDICAL GROUP | Age: 54
End: 2019-01-10

## 2019-01-10 DIAGNOSIS — E11.9 TYPE 2 DIABETES MELLITUS WITHOUT COMPLICATION, WITHOUT LONG-TERM CURRENT USE OF INSULIN (HCC): ICD-10-CM

## 2019-01-11 DIAGNOSIS — E11.9 TYPE 2 DIABETES MELLITUS WITHOUT COMPLICATION, WITHOUT LONG-TERM CURRENT USE OF INSULIN (HCC): ICD-10-CM

## 2019-01-11 RX ORDER — GLUCOSAMINE HCL/CHONDROITIN SU 500-400 MG
CAPSULE ORAL
Qty: 100 STRIP | Refills: 2 | Status: SHIPPED | OUTPATIENT
Start: 2019-01-11

## 2019-01-11 RX ORDER — LANCETS 30 GAUGE
EACH MISCELLANEOUS
Qty: 100 EACH | Refills: 2 | Status: SHIPPED | OUTPATIENT
Start: 2019-01-11

## 2019-01-11 RX ORDER — METFORMIN HYDROCHLORIDE 500 MG/1
500 TABLET, EXTENDED RELEASE ORAL 2 TIMES DAILY
Qty: 180 TAB | Refills: 1 | Status: SHIPPED | OUTPATIENT
Start: 2019-01-11 | End: 2019-01-15 | Stop reason: SDUPTHER

## 2019-01-11 NOTE — TELEPHONE ENCOUNTER
Phone Number Called: [phone] (home)       Message: pt called is needing her metformin approved today she is out of this medication as of today.    Left Message for patient to call back: N\A

## 2019-01-11 NOTE — TELEPHONE ENCOUNTER
Was the patient seen in the last year in this department? Yes    Does patient have an active prescription for medications requested? Yes    Received Request Via: Patient         Patient states she was told to double up on medication the pharmacy will not let her renew stating too early to fill patient will be out of medication 01/11/2019

## 2019-01-11 NOTE — TELEPHONE ENCOUNTER
Prescriptions for glucometer, lancets, and test strips were sent to pharmacy.  I also resent prescription for metformin.  Please inform patient.  Thank you

## 2019-01-15 DIAGNOSIS — E11.9 TYPE 2 DIABETES MELLITUS WITHOUT COMPLICATION, WITHOUT LONG-TERM CURRENT USE OF INSULIN (HCC): ICD-10-CM

## 2019-01-15 RX ORDER — METFORMIN HYDROCHLORIDE 500 MG/1
500 TABLET, EXTENDED RELEASE ORAL 3 TIMES DAILY
Qty: 270 TAB | Refills: 1 | Status: SHIPPED | OUTPATIENT
Start: 2019-01-15 | End: 2019-12-19 | Stop reason: SDUPTHER

## 2019-01-16 ENCOUNTER — PATIENT MESSAGE (OUTPATIENT)
Dept: MEDICAL GROUP | Age: 54
End: 2019-01-16

## 2019-01-16 DIAGNOSIS — S46.002S INJURY OF LEFT ROTATOR CUFF, SEQUELA: ICD-10-CM

## 2019-01-17 ENCOUNTER — TELEPHONE (OUTPATIENT)
Dept: MEDICAL GROUP | Age: 54
End: 2019-01-17

## 2019-01-17 NOTE — TELEPHONE ENCOUNTER
1. Caller Name: Sh[facility]                                            Shanna called and state she's having a hard time with patient rescheduling for her additional screening for her breast. Patient have a abnormal memmogram back November and patient cancelled her appt twice now.    Shanna is wondering what can she do to get patient set an appointment.    TERESA

## 2019-01-17 NOTE — TELEPHONE ENCOUNTER
If the patient does not want to do repeat imagings and keep canceling the appointment, I am not sure what I can do at this time.  Patient is active on my chart.  We can send a reminder to my chart.  I will discuss this issue with patient at future office visit.  Thank you

## 2019-01-31 ENCOUNTER — PATIENT MESSAGE (OUTPATIENT)
Dept: MEDICAL GROUP | Age: 54
End: 2019-01-31

## 2019-01-31 DIAGNOSIS — R23.2 HOT FLASHES: ICD-10-CM

## 2019-01-31 DIAGNOSIS — R20.2 NUMBNESS AND TINGLING OF BOTH FEET: ICD-10-CM

## 2019-01-31 DIAGNOSIS — R20.0 NUMBNESS AND TINGLING OF BOTH FEET: ICD-10-CM

## 2019-02-01 RX ORDER — MELOXICAM 15 MG/1
15 TABLET ORAL DAILY
Qty: 90 TAB | Refills: 0 | Status: SHIPPED | OUTPATIENT
Start: 2019-02-01 | End: 2019-03-31 | Stop reason: SDUPTHER

## 2019-02-01 RX ORDER — GABAPENTIN 100 MG/1
100 CAPSULE ORAL 3 TIMES DAILY
Qty: 90 CAP | Refills: 0 | Status: SHIPPED | OUTPATIENT
Start: 2019-02-01 | End: 2019-02-21 | Stop reason: SDUPTHER

## 2019-02-21 ENCOUNTER — OFFICE VISIT (OUTPATIENT)
Dept: MEDICAL GROUP | Age: 54
End: 2019-02-21
Payer: COMMERCIAL

## 2019-02-21 VITALS
SYSTOLIC BLOOD PRESSURE: 98 MMHG | WEIGHT: 113.8 LBS | HEIGHT: 63 IN | TEMPERATURE: 98.7 F | BODY MASS INDEX: 20.16 KG/M2 | HEART RATE: 68 BPM | DIASTOLIC BLOOD PRESSURE: 66 MMHG | OXYGEN SATURATION: 91 %

## 2019-02-21 DIAGNOSIS — S46.002S INJURY OF LEFT ROTATOR CUFF, SEQUELA: ICD-10-CM

## 2019-02-21 DIAGNOSIS — Z23 NEED FOR VACCINATION: ICD-10-CM

## 2019-02-21 DIAGNOSIS — R92.8 ABNORMAL MAMMOGRAM OF BOTH BREASTS: ICD-10-CM

## 2019-02-21 DIAGNOSIS — E11.42 DIABETIC POLYNEUROPATHY ASSOCIATED WITH TYPE 2 DIABETES MELLITUS (HCC): ICD-10-CM

## 2019-02-21 DIAGNOSIS — R23.2 HOT FLASHES: ICD-10-CM

## 2019-02-21 DIAGNOSIS — E11.9 TYPE 2 DIABETES MELLITUS WITHOUT COMPLICATION, WITHOUT LONG-TERM CURRENT USE OF INSULIN (HCC): Primary | ICD-10-CM

## 2019-02-21 DIAGNOSIS — Z72.0 TOBACCO USE: ICD-10-CM

## 2019-02-21 LAB
HBA1C MFR BLD: 5.3 % (ref ?–5.8)
INT CON NEG: NORMAL
INT CON POS: NORMAL

## 2019-02-21 PROCEDURE — 83036 HEMOGLOBIN GLYCOSYLATED A1C: CPT | Performed by: FAMILY MEDICINE

## 2019-02-21 PROCEDURE — 99215 OFFICE O/P EST HI 40 MIN: CPT | Mod: 25 | Performed by: FAMILY MEDICINE

## 2019-02-21 PROCEDURE — 90746 HEPB VACCINE 3 DOSE ADULT IM: CPT | Performed by: FAMILY MEDICINE

## 2019-02-21 PROCEDURE — 90471 IMMUNIZATION ADMIN: CPT | Performed by: FAMILY MEDICINE

## 2019-02-21 RX ORDER — GABAPENTIN 100 MG/1
200 CAPSULE ORAL 3 TIMES DAILY
Qty: 180 CAP | Refills: 5 | Status: SHIPPED | OUTPATIENT
Start: 2019-02-21 | End: 2019-03-08 | Stop reason: SDUPTHER

## 2019-02-21 NOTE — PROGRESS NOTES
"RN-CDE Note  53 year old female with type 2 diabetes, here for follow up.  States she needs paperwork filled out for disability.   Subjective:     Health changes since last visit/interval Hx: no changes in health, complaining of neuropathy pain in both feet.     Medications (including changes made today)  Current Outpatient Prescriptions   Medication Sig Dispense Refill   • gabapentin (NEURONTIN) 100 MG Cap Take 2 Caps by mouth 3 times a day. 180 Cap 5   • meloxicam (MOBIC) 15 MG tablet Take 1 Tab by mouth every day. With foods 90 Tab 0   • metFORMIN ER (GLUCOPHAGE XR) 500 MG TABLET SR 24 HR Take 1 Tab by mouth 3 times a day. (Patient taking differently: Take 500 mg by mouth 2 times a day.) 270 Tab 1   • Lancets Check blood sugar once daily and prn symptoms high or low sugar. 100 Each 2   • Glucose Blood (BLOOD GLUCOSE TEST STRIPS) Strip Test your blood sugar once daily and PRN for symptoms of high or low blood sugar 100 Strip 2   • pravastatin (PRAVACHOL) 10 MG Tab Take 1 Tab by mouth every bedtime. 90 Tab 1     No current facility-administered medications for this visit.        Taking daily ASA: No  Taking above medications as prescribed: yes  SIDE EFFECTS: Patient denies side effects to medications    Exercise: states she tries to walk around as much as possible  Diet: \"healthy\" diet  in general  Patient's body mass index is 20.16 kg/m². Exercise and nutrition counseling were performed at this visit.      Health Maintenance:   Health Maintenance Due   Topic Date Due   • PAP SMEAR  06/18/1987   • IMM ZOSTER VACCINES (1 of 2) 06/18/2016   • IMM HEP B VACCINE (2 of 3 - Risk 3-dose series) 12/18/2018       Immunizations:   PPSV23: Up-to-date  Oerygaf50: N/A  Tdap: Up-to-date  Flu: Up-to-date  Hep B: not asked    DM:   Last A1c:   Lab Results   Component Value Date/Time    HBA1C 5.3 02/21/2019 10:03 AM      A1C GOAL: < 7    Glucose monitoring frequency: testing bid, fasting and ac dinner.   States blood sugars can " fluctuate from 68 to 220    Hypoglycemic episodes: no    Last Retinal Exam: on file and up-to-date  Daily Foot Exam: Yes   Routine Dental Exams: No    Lab Results   Component Value Date/Time    MALBCRT see below 11/20/2018 09:19 AM    MICROALBUR <0.7 11/20/2018 09:19 AM        ACR Albumin/Creatinine Ratio goal <30     HTN:   Blood pressure goal <140/<80 at goal.   Currently Rx ACE/ARB: No    Dyslipidemia:    Lab Results   Component Value Date/Time    CHOLSTRLTOT 177 11/13/2018 08:53 AM     (H) 11/13/2018 08:53 AM    HDL 45 11/13/2018 08:53 AM    TRIGLYCERIDE 127 11/13/2018 08:53 AM       Lab Results   Component Value Date/Time    SODIUM 141 11/13/2018 08:53 AM    POTASSIUM 4.2 11/13/2018 08:53 AM    CHLORIDE 105 11/13/2018 08:53 AM    CO2 30 11/13/2018 08:53 AM    GLUCOSE 96 11/13/2018 08:53 AM    BUN 20 11/13/2018 08:53 AM    CREATININE 0.61 11/13/2018 08:53 AM     Lab Results   Component Value Date/Time    ALKPHOSPHAT 71 11/13/2018 08:53 AM    ASTSGOT 16 11/13/2018 08:53 AM    ALTSGPT 18 11/13/2018 08:53 AM    TBILIRUBIN 0.3 11/13/2018 08:53 AM        Currently Rx Statin: Yes    She  reports that she has been smoking.  She has a 15.00 pack-year smoking history. She has never used smokeless tobacco.    Objective:     Exam:  Monofilament: not done    Plan:     Discussed and educated on:   - All medications, side effects and compliance (discussed carefully)  - Annual eye examinations at Ophthalmology  - Home glucose monitoring emphasized  - Weight control and daily exercise    Recommended medication changes: none

## 2019-02-21 NOTE — PROGRESS NOTES
Subjective:   CC: Diabetes follow-up    HPI:     Michelle Shaw is a 53 y.o. female who is an established patient of the clinic, presents with the following concerns:     The patient has chronic left shoulder pain for awhile now that is a work related injury.  Patient is status post left shoulder arthroscopy in [facility] in July 2018.  She is receiving physical therapy twice a week with Dr. Medina at [facility] and sees Orthopaedic once a month, but states there are times when the pain worsens after therapy. She has received approximately 6 steroid injections to the area with no relief. She has not worked 1/13/2018, went into short term disability on 1/17/2018, and then long term disability in July 2018.  She requests recertification of long-term disability today.  She is taking Tylenol 375mg and Meloxicam intermittently with minimal relief. She is right-handed. The patient is taking Gabapentin for hot flashes, but continues to have hot flashes occurring in the mornings. She is also taking it for neuropathy with minor relief and states symptoms worsen at night when laying down. The patient has history of diabetes and is well controlled on Metformin 500mg three times daily. She monitors her sugar three times daily, which has been normal. A1C today is 5.3. She is well controlled on Pravastatin for dyslipidemia and takes this nightly without any side effects. She has history of abnormal mammogram and has not scheduled another mammogram yet because she is scared for the possibility of having cancer or losing her breasts, and states she does not have a support group to go with her. She continues to smoke 1/2 pack of cigarettes. She has attempted cessation previously with Wellbutrin, but states it did not work well for her.  She is contemplating about smoking cessation, but does not feel ready today.      Current medicines (including changes today)  Current Outpatient Prescriptions   Medication Sig Dispense  "Refill   • gabapentin (NEURONTIN) 100 MG Cap Take 2 Caps by mouth 3 times a day. 180 Cap 5   • meloxicam (MOBIC) 15 MG tablet Take 1 Tab by mouth every day. With foods 90 Tab 0   • metFORMIN ER (GLUCOPHAGE XR) 500 MG TABLET SR 24 HR Take 1 Tab by mouth 3 times a day. (Patient taking differently: Take 500 mg by mouth 2 times a day.) 270 Tab 1   • Lancets Check blood sugar once daily and prn symptoms high or low sugar. 100 Each 2   • Glucose Blood (BLOOD GLUCOSE TEST STRIPS) Strip Test your blood sugar once daily and PRN for symptoms of high or low blood sugar 100 Strip 2   • pravastatin (PRAVACHOL) 10 MG Tab Take 1 Tab by mouth every bedtime. 90 Tab 1     No current facility-administered medications for this visit.      She  has a past medical history of Gestational diabetes.    I personally reviewed patient's problem list, allergies, medications, family hx, social hx with patient and update EPIC.     REVIEW OF SYSTEMS:  CONSTITUTIONAL:  Denies night sweats, fatigue, malaise, lethargy, fever or chills.  RESPIRATORY:  Denies cough, wheeze, hemoptysis, or shortness of breath.  CARDIOVASCULAR:  Denies chest pains, palpitations, pedal edema     Objective:     Blood pressure (!) 98/66, pulse 68, temperature 37.1 °C (98.7 °F), height 1.6 m (5' 3\"), weight 51.6 kg (113 lb 12.8 oz), last menstrual period 12/03/2015, SpO2 91 %, not currently breastfeeding. Body mass index is 20.16 kg/m².    Physical Exam:  Constitutional: awake, alert, in no distress.  Skin: Warm, dry, good turgor, no rashes, bruises, ulcers in visible areas.  Eye: conjunctiva clear, lids neg for edema or lesions.  Neck: Trachea midline, no masses, no thyromegaly. No cervical or supraclavicular lymphadenopathy  Respiratory: Unlabored respiratory effort, lungs clear to auscultation, no wheezes, no rales.  Cardiovascular: Normal S1, S2, no murmur, no pedal edema.  Psych: Oriented x3, affect and mood wnl, intact judgement and insight.       Assessment and Plan: "   The following treatment plan was discussed    1. Type 2 diabetes mellitus without complication, without long-term current use of insulin (HCC)  New diagnosis, responding well to metformin, A1c was 5.3 today.  Patient denies any side effect with metformin.  She is working on dietary modification.  She is active, but does not exercise regularly due to chronic left shoulder pain.  -Continue metformin 5 mg twice daily  - Discussed dietary modification, exercise, weight loss  - Smoking cessation  - Annual eye exam  - Regular foot exam  - Discussed prevention and management of hypoglycemia  - Discussed vaccines recommendations: PPSV 23 and hepatitis B.   - Side effects of all medications discussed with patient  - Follow up in 6 months.   - POCT Hemoglobin A1C    2. Diabetic polyneuropathy associated with type 2 diabetes mellitus (HCC)  Chronic, currently taking gabapentin 100 mg 3 times daily, however, she endorses mild relief with medication and complains of persistent symptoms still.  Plans:  -Increase gabapentin to 200 mg 3 times daily    3. Hot flashes  Chronic, minimal response to gabapentin 100 mg 3 times daily.  Plans:  -Increase gabapentin to 200 mg 3 times daily: Gabapentin (NEURONTIN) 100 MG Cap; Take 2 Caps by mouth 3 times a day.  Dispense: 180 Cap; Refill: 5    4. Injury of left rotator cuff, sequela  Chronic left shoulder pain secondary to work-related injury, status post left shoulder arthroscopy in [facility] in July 2018, working with orthopedic surgeon and physical therapy.  Pain is in relatively good control with Tylenol and meloxicam as needed.  Patient still has poor left shoulder range of motion and significant pain with internal rotation and abduction.  Patient is in long-term disability and requests recertification of disability today.  -Continue with Tylenol and meloxicam as needed  -Continue to follow-up with physical therapy and orthopedic surgeon  -Long-term disability document filled out and  scanned into patient's chart    5. Abnormal mammogram of both breasts  History of abnormal mammogram in December 2018.  There were cluster of calcifications located at 12 o'clock position in each breast.  Diagnostic mammogram and breast ultrasound were ordered.  However, patient was reluctant to proceed.  She states that she is scared of finding out about possible breast cancer.  She complains of the lack of support from her  and family.  -I discussed abnormal findings with patient and encouraged her to schedule appointment for mammogram and breast ultrasound.  Patient states that she will call for the appointment.  Patient is to call me if she had any questions.    6. Need for vaccination  - Hepatitis B Vaccine Adult IM    7. Tobacco use  Chronic, smokes approximately 0.5 pack/day for 30 years.  Patient has tried to stop smoking couple times without success.  Patient is contemplating smoking cessation again, but not ready at this time.  Her  also smokes.  - I discussed risks and health complications of chronic tobacco abuse. Recommended tobacco cessation. I offered pharmacotherapy for smoking cessation if needed.      Patient was seen for 40 minutes face to face of which greater than 50% of appointment time was spent on counseling and coordination of care regarding the above     Cherelle Witt M.D.    Followup: Return in about 6 months (around 8/21/2019) for Diabetes.    Please note that this dictation was created using voice recognition software and/or scribes. I have made every reasonable attempt to correct obvious errors, but I expect that there are errors of grammar and possibly content that I did not discover before finalizing the note.     Bert CHRISTIANSON (Olman), am scribing for, and in the presence of, Cherelle Witt M.D.    Electronically signed by: Bert Colon (Olman), 2/21/2019    Cherelle CHRISTIANSON M.D. personally performed the services described in this documentation, as scribed by Bert Colon in my  presence, and it is both accurate and complete.

## 2019-03-01 ENCOUNTER — OFFICE VISIT (OUTPATIENT)
Dept: MEDICAL GROUP | Age: 54
End: 2019-03-01
Payer: COMMERCIAL

## 2019-03-01 ENCOUNTER — OFFICE VISIT (OUTPATIENT)
Dept: URGENT CARE | Facility: CLINIC | Age: 54
End: 2019-03-01
Payer: COMMERCIAL

## 2019-03-01 VITALS
SYSTOLIC BLOOD PRESSURE: 128 MMHG | OXYGEN SATURATION: 96 % | WEIGHT: 112.8 LBS | TEMPERATURE: 99.1 F | DIASTOLIC BLOOD PRESSURE: 76 MMHG | BODY MASS INDEX: 19.99 KG/M2 | HEIGHT: 63 IN | HEART RATE: 99 BPM

## 2019-03-01 VITALS
OXYGEN SATURATION: 94 % | HEART RATE: 99 BPM | SYSTOLIC BLOOD PRESSURE: 120 MMHG | BODY MASS INDEX: 19.84 KG/M2 | WEIGHT: 112 LBS | DIASTOLIC BLOOD PRESSURE: 78 MMHG | HEIGHT: 63 IN | TEMPERATURE: 98.6 F

## 2019-03-01 DIAGNOSIS — E11.42 DIABETIC POLYNEUROPATHY ASSOCIATED WITH TYPE 2 DIABETES MELLITUS (HCC): ICD-10-CM

## 2019-03-01 DIAGNOSIS — G72.9 MYOPATHY: Primary | ICD-10-CM

## 2019-03-01 DIAGNOSIS — I73.9 CLAUDICATION (HCC): ICD-10-CM

## 2019-03-01 PROCEDURE — 99213 OFFICE O/P EST LOW 20 MIN: CPT | Mod: 25 | Performed by: PHYSICIAN ASSISTANT

## 2019-03-01 PROCEDURE — 99214 OFFICE O/P EST MOD 30 MIN: CPT | Performed by: FAMILY MEDICINE

## 2019-03-01 RX ORDER — CILOSTAZOL 50 MG/1
50 TABLET ORAL 2 TIMES DAILY
Qty: 60 TAB | Refills: 1 | Status: SHIPPED | OUTPATIENT
Start: 2019-03-01 | End: 2019-12-19

## 2019-03-01 ASSESSMENT — PATIENT HEALTH QUESTIONNAIRE - PHQ9: CLINICAL INTERPRETATION OF PHQ2 SCORE: 0

## 2019-03-01 NOTE — LETTER
March 1, 2019         Patient: Michelle Shaw   YOB: 1965   Date of Visit: 3/1/2019           To Whom it May Concern:    Michelle Shaw was seen in my clinic on 3/1/2019.     If you have any questions or concerns, please don't hesitate to call.        Sincerely,           Ann Escobar P.A.-C.  Electronically Signed

## 2019-03-01 NOTE — PROGRESS NOTES
"Subjective:   CC: Bilateral calf pain    HPI:     Michelle Shaw is a 53 y.o. female who is an established patient of the clinic, presents with the following concerns:     Today, patient complains of severe bilateral calf pain pain with ambulation over the past few days.   Patient presents at urgent care earlier today with the complaints of \"nerve pain\" and was advised to schedule appointment to follow-up with me.  Patient walked in the clinic today with our appointment and requested to be seen.  Pain is described as sharp, nonradiating, felt deep in the muscle tissue, worse with ambulation, better with rest. she denies fever, chills, history of trauma to affected area, skin rash back pain, ankle swelling, recent increase in activity or exercise.  Patient has history of type 2 diabetes, this is a new diagnosis.  She is currently taking metformin 500 mg twice daily.  Patient is taking gabapentin 200 mg 3 times daily for postmenopausal symptoms as well as diabetic polyneuropathy.  Pravastatin was recently added.  Patient endorses adequate hydration.  Patient is a chronic smoker.  She smokes approximately 0.5 pack/day for the past 30 years.  She also endorses secondary smoke exposure as her  also smokes.    Current medicines (including changes today)  Current Outpatient Prescriptions   Medication Sig Dispense Refill   • cilostazol (PLETAL) 50 MG tablet Take 1 Tab by mouth 2 times a day. 60 Tab 1   • gabapentin (NEURONTIN) 100 MG Cap Take 2 Caps by mouth 3 times a day. 180 Cap 5   • meloxicam (MOBIC) 15 MG tablet Take 1 Tab by mouth every day. With foods 90 Tab 0   • metFORMIN ER (GLUCOPHAGE XR) 500 MG TABLET SR 24 HR Take 1 Tab by mouth 3 times a day. (Patient taking differently: Take 500 mg by mouth 2 times a day.) 270 Tab 1   • Lancets Check blood sugar once daily and prn symptoms high or low sugar. 100 Each 2   • Glucose Blood (BLOOD GLUCOSE TEST STRIPS) Strip Test your blood sugar once daily and PRN for " "symptoms of high or low blood sugar 100 Strip 2     No current facility-administered medications for this visit.      She  has a past medical history of Gestational diabetes.    I personally reviewed patient's problem list, allergies, medications, family hx, social hx with patient and update EPIC.     REVIEW OF SYSTEMS:  CONSTITUTIONAL:  Denies night sweats, fatigue, malaise, lethargy, fever or chills.  RESPIRATORY:  Denies cough, wheeze, hemoptysis, or shortness of breath.  CARDIOVASCULAR:  Denies chest pains, palpitations, pedal edema     Objective:     Blood pressure 128/76, pulse 99, temperature 37.3 °C (99.1 °F), temperature source Temporal, height 1.6 m (5' 3\"), weight 51.2 kg (112 lb 12.8 oz), last menstrual period 12/03/2015, SpO2 96 %, not currently breastfeeding. Body mass index is 19.98 kg/m².    Physical Exam:  Constitutional: awake, alert, in no distress.  Skin: Warm, dry, good turgor, no rashes, bruises, ulcers in visible areas.  Eye: conjunctiva clear, lids neg for edema or lesions.  Neck: Trachea midline, no masses, no thyromegaly. No cervical or supraclavicular lymphadenopathy  Respiratory: Unlabored respiratory effort, lungs clear to auscultation, no wheezes, no rales.  Cardiovascular: Normal S1, S2, no murmur, no pedal edema.  Abdomen: Soft, non-tender to palpation, active BS, no hernia, no hepatosplenomegaly, negative rebound or guarding.   Neuro: CN2-12 grossly intact. Strength 5/5, reflexes 2/4 in all extremities, no sensory deficits.   Psych: Oriented x3, affect and mood wnl, intact judgement and insight.   Musculoskeletal exam: No visible deformities of the lower extremity, negative skin changes or concerning lesions, negative ankle/knee swelling or erythema bilaterally, calf pain is reproduced with resisted plantar flexion, calf muscles are moderately tender to palpation, Achilles tendons are nontender to palpation bilaterally.  Positive Oneal test bilaterally.  Feet are cool bilaterally. "  Dorsalis pedis are weakly palpable.  Patient is limping with ambulation due to severe pain    Assessment and Plan:   The following treatment plan was discussed    1. Myopathy  2. Claudication (HCC)  53-year-old female with history of type 2 diabetes, chronic tobacco abuse, diabetic polyneuropathy, hyperlipidemia who presented with acute onset of bilateral calf pain with ambulation.  She denies fever, chills, skin rash, knee/ankle swelling, history of trauma to affected area.  Her new medications are pravastatin and metformin.  Patient is chronic smoker.  She smokes 0.5 pack/day for the past 30 years.  She also endorses secondhand smoke exposure as her  also smokes.  History and exams are concerning for acute myopathy and claudication.  Will discontinue pravastatin and order arterial Doppler to evaluate blood flow to lower extremities to assess PVD.  - CBC WITH DIFFERENTIAL; Future  - Comp Metabolic Panel; Future  - TSH WITH REFLEX TO FT4; Future  - WESTERGREN SED RATE; Future  - CRP QUANTITIVE (NON-CARDIAC); Future  - CREATINE KINASE; Future  - US-EXTREMITY ARTERY LOWER BILAT; Future   - Discontinue pravastatin  - Continue gabapentin for diabetic polyneuropathy    Patient was seen for 25 minutes face to face of which greater than 50% of appointment time was spent on counseling and coordination of care regarding the above     Cherelle Witt M.D.    Followup: Return in about 4 weeks (around 3/29/2019).    Please note that this dictation was created using voice recognition software and/or scribes. I have made every reasonable attempt to correct obvious errors, but I expect that there are errors of grammar and possibly content that I did not discover before finalizing the note.     Chandu CHRISTIANSON (Olman), am scribing for, and in the presence of, Cherelle Witt M.D.    Electronically signed by: Chandu Houston (Olman), 3/1/2019    Cherelle CHRISTIANSON M.D. personally performed the services described in this documentation, as  scribed by Chandu Houston in my presence, and it is both accurate and complete.

## 2019-03-02 NOTE — PROGRESS NOTES
Subjective:      Michelle Shaw is a 53 y.o. female who presents with Leg Pain (both legs and feet / buring / numbness / x4days )      HPI:  This is a new problem. Michelle Shaw is a 53 y.o. female who presents for evaluation of bilateral leg pain/numbness.  Patient states that she is diagnosed with diabetic polyneuropathy a few months ago and that this feels the same but it seems to be rapidly progressing.  She does see her primary care regarding this and just a few weeks ago her dose of gabapentin was increased.  She says that the last few days, however, the pain has been very intense and has been painful for her even to walk.  She also takes meloxicam for her symptoms.  She states that her diabetes is still been well controlled on metformin.  She denies any injury to her lower extremities.  She denies fever/chills, nausea/vomiting, chest pain, or shortness of breath.        Review of Systems   Constitutional: Negative for chills and fever.   HENT: Negative for sore throat.    Eyes: Negative for blurred vision.   Respiratory: Negative for shortness of breath.    Cardiovascular: Positive for claudication. Negative for chest pain, palpitations and leg swelling.   Gastrointestinal: Negative for nausea and vomiting.   Musculoskeletal:        Bilateral lower extremity pain   Skin: Negative for rash.   Neurological: Positive for tingling and sensory change. Negative for focal weakness.       PMH:  has a past medical history of Gestational diabetes.  MEDS:   Current Outpatient Prescriptions:   •  cilostazol (PLETAL) 50 MG tablet, Take 1 Tab by mouth 2 times a day., Disp: 60 Tab, Rfl: 1  •  gabapentin (NEURONTIN) 100 MG Cap, Take 2 Caps by mouth 3 times a day., Disp: 180 Cap, Rfl: 5  •  meloxicam (MOBIC) 15 MG tablet, Take 1 Tab by mouth every day. With foods, Disp: 90 Tab, Rfl: 0  •  metFORMIN ER (GLUCOPHAGE XR) 500 MG TABLET SR 24 HR, Take 1 Tab by mouth 3 times a day. (Patient taking differently: Take 500 mg by  "mouth 2 times a day.), Disp: 270 Tab, Rfl: 1  •  Lancets, Check blood sugar once daily and prn symptoms high or low sugar., Disp: 100 Each, Rfl: 2  •  Glucose Blood (BLOOD GLUCOSE TEST STRIPS) Strip, Test your blood sugar once daily and PRN for symptoms of high or low blood sugar, Disp: 100 Strip, Rfl: 2  ALLERGIES:   Allergies   Allergen Reactions   • Aspirin      Doesn't know what happens mother told her she is allergic    • Penicillins      Doesn't know what happens mother told her she is allergic     SURGHX:   Past Surgical History:   Procedure Laterality Date   • SHOULDER ARTHROSCOPY Left 2018   • ACL REPAIR Bilateral    • APPENDECTOMY     • PRIMARY C SECTION     • TUBAL COAGULATION LAPAROSCOPIC BILATERAL       SOCHX:  reports that she has been smoking.  She has a 15.00 pack-year smoking history. She has never used smokeless tobacco. She reports that she drinks alcohol. She reports that she does not use drugs.  FH: Family history was reviewed, no pertinent findings to report     Objective:     /78 (BP Location: Right arm, Patient Position: Sitting, BP Cuff Size: Adult)   Pulse 99   Temp 37 °C (98.6 °F) (Temporal)   Ht 1.6 m (5' 3\")   Wt 50.8 kg (112 lb)   LMP 12/03/2015 (Approximate)   SpO2 94%   BMI 19.84 kg/m²      Physical Exam   Constitutional: She is oriented to person, place, and time. She appears well-developed and well-nourished.   HENT:   Head: Normocephalic and atraumatic.   Right Ear: External ear normal.   Left Ear: External ear normal.   Eyes: Pupils are equal, round, and reactive to light. Conjunctivae are normal.   Cardiovascular: Normal rate, regular rhythm and normal heart sounds.    No murmur heard.  Pulses:       Dorsalis pedis pulses are 2+ on the right side, and 2+ on the left side.        Posterior tibial pulses are 2+ on the right side, and 2+ on the left side.   Pulmonary/Chest: Effort normal and breath sounds normal. She has no wheezes.   Neurological: She is alert and " oriented to person, place, and time. She has normal strength.   Approximately 30% decreased sensation to sharp stimuli in bilateral lower extremities from the knees down.  Markedly decreased sensation to both stimuli.   Skin: Skin is warm and dry. Capillary refill takes less than 2 seconds.   Psychiatric: She has a normal mood and affect. Her behavior is normal. Judgment normal.          Assessment/Plan:     1. Diabetic polyneuropathy associated with type 2 diabetes mellitus (HCC)  -Advised patient to contact her PCP today to try to get appointment for follow-up as her medications may need to be adjusted.      Differential Diagnosis, natural history, and supportive care discussed. Return to the Urgent Care or follow up with your PCP if symptoms fail to resolve, or for any new or worsening symptoms. Emergency room precautions discussed. Patient and/or family appears understanding of information.

## 2019-03-03 ASSESSMENT — ENCOUNTER SYMPTOMS
SORE THROAT: 0
BLURRED VISION: 0
CLAUDICATION: 1
TINGLING: 1
FOCAL WEAKNESS: 0
FEVER: 0
NAUSEA: 0
SHORTNESS OF BREATH: 0
CHILLS: 0
SENSORY CHANGE: 1
PALPITATIONS: 0
VOMITING: 0

## 2019-03-06 ENCOUNTER — PATIENT MESSAGE (OUTPATIENT)
Dept: MEDICAL GROUP | Age: 54
End: 2019-03-06

## 2019-03-06 DIAGNOSIS — E11.9 TYPE 2 DIABETES MELLITUS WITHOUT COMPLICATION, WITHOUT LONG-TERM CURRENT USE OF INSULIN (HCC): ICD-10-CM

## 2019-03-06 NOTE — PATIENT COMMUNICATION
1. Caller Name: Michelle Shaw      Call Back Number: [phone] (home)         Patient approves a detailed voicemail message: N\A    2. SPECIFIC Action To Be Taken: Referral pending, please sign.    3. Diagnosis/Clinical Reason for Request: Pt would like a second opinion.     4. Specialty & Provider Name/Lab/Imaging Location: Endocrinologist     5. Is appointment scheduled for requested order/referral: no    Patient was informed they will receive a return phone call from the office ONLY if there are any questions before processing their request. Advised to call back if they haven't received a call from the referral department in 5 days.

## 2019-03-07 ENCOUNTER — TELEPHONE (OUTPATIENT)
Dept: MEDICAL GROUP | Age: 54
End: 2019-03-07

## 2019-03-07 ENCOUNTER — HOSPITAL ENCOUNTER (OUTPATIENT)
Dept: LAB | Facility: MEDICAL CENTER | Age: 54
End: 2019-03-07
Attending: FAMILY MEDICINE
Payer: COMMERCIAL

## 2019-03-07 DIAGNOSIS — G72.9 MYOPATHY: ICD-10-CM

## 2019-03-07 DIAGNOSIS — R23.2 HOT FLASHES: ICD-10-CM

## 2019-03-07 LAB
ALBUMIN SERPL BCP-MCNC: 4.2 G/DL (ref 3.2–4.9)
ALBUMIN/GLOB SERPL: 2 G/DL
ALP SERPL-CCNC: 53 U/L (ref 30–99)
ALT SERPL-CCNC: 16 U/L (ref 2–50)
ANION GAP SERPL CALC-SCNC: 5 MMOL/L (ref 0–11.9)
AST SERPL-CCNC: 16 U/L (ref 12–45)
BASOPHILS # BLD AUTO: 0.9 % (ref 0–1.8)
BASOPHILS # BLD: 0.08 K/UL (ref 0–0.12)
BILIRUB SERPL-MCNC: 0.4 MG/DL (ref 0.1–1.5)
BUN SERPL-MCNC: 18 MG/DL (ref 8–22)
CALCIUM SERPL-MCNC: 9 MG/DL (ref 8.5–10.5)
CHLORIDE SERPL-SCNC: 110 MMOL/L (ref 96–112)
CK SERPL-CCNC: 53 U/L (ref 0–154)
CO2 SERPL-SCNC: 26 MMOL/L (ref 20–33)
CREAT SERPL-MCNC: 0.59 MG/DL (ref 0.5–1.4)
CRP SERPL HS-MCNC: 0.17 MG/DL (ref 0–0.75)
EOSINOPHIL # BLD AUTO: 0.25 K/UL (ref 0–0.51)
EOSINOPHIL NFR BLD: 2.9 % (ref 0–6.9)
ERYTHROCYTE [DISTWIDTH] IN BLOOD BY AUTOMATED COUNT: 43.7 FL (ref 35.9–50)
ERYTHROCYTE [SEDIMENTATION RATE] IN BLOOD BY WESTERGREN METHOD: 16 MM/HOUR (ref 0–30)
GLOBULIN SER CALC-MCNC: 2.1 G/DL (ref 1.9–3.5)
GLUCOSE SERPL-MCNC: 90 MG/DL (ref 65–99)
HCT VFR BLD AUTO: 41 % (ref 37–47)
HGB BLD-MCNC: 13.1 G/DL (ref 12–16)
IMM GRANULOCYTES # BLD AUTO: 0.04 K/UL (ref 0–0.11)
IMM GRANULOCYTES NFR BLD AUTO: 0.5 % (ref 0–0.9)
LYMPHOCYTES # BLD AUTO: 2.35 K/UL (ref 1–4.8)
LYMPHOCYTES NFR BLD: 26.8 % (ref 22–41)
MCH RBC QN AUTO: 29.8 PG (ref 27–33)
MCHC RBC AUTO-ENTMCNC: 32 G/DL (ref 33.6–35)
MCV RBC AUTO: 93.4 FL (ref 81.4–97.8)
MONOCYTES # BLD AUTO: 0.84 K/UL (ref 0–0.85)
MONOCYTES NFR BLD AUTO: 9.6 % (ref 0–13.4)
NEUTROPHILS # BLD AUTO: 5.21 K/UL (ref 2–7.15)
NEUTROPHILS NFR BLD: 59.3 % (ref 44–72)
NRBC # BLD AUTO: 0 K/UL
NRBC BLD-RTO: 0 /100 WBC
PLATELET # BLD AUTO: 235 K/UL (ref 164–446)
PMV BLD AUTO: 9.8 FL (ref 9–12.9)
POTASSIUM SERPL-SCNC: 4.2 MMOL/L (ref 3.6–5.5)
PROT SERPL-MCNC: 6.3 G/DL (ref 6–8.2)
RBC # BLD AUTO: 4.39 M/UL (ref 4.2–5.4)
SODIUM SERPL-SCNC: 141 MMOL/L (ref 135–145)
TSH SERPL DL<=0.005 MIU/L-ACNC: 2.11 UIU/ML (ref 0.38–5.33)
WBC # BLD AUTO: 8.8 K/UL (ref 4.8–10.8)

## 2019-03-07 PROCEDURE — 80053 COMPREHEN METABOLIC PANEL: CPT

## 2019-03-07 PROCEDURE — 84443 ASSAY THYROID STIM HORMONE: CPT

## 2019-03-07 PROCEDURE — 86140 C-REACTIVE PROTEIN: CPT

## 2019-03-07 PROCEDURE — 36415 COLL VENOUS BLD VENIPUNCTURE: CPT

## 2019-03-07 PROCEDURE — 85652 RBC SED RATE AUTOMATED: CPT

## 2019-03-07 PROCEDURE — 85025 COMPLETE CBC W/AUTO DIFF WBC: CPT

## 2019-03-07 PROCEDURE — 82550 ASSAY OF CK (CPK): CPT

## 2019-03-07 NOTE — TELEPHONE ENCOUNTER
Please call pt's pharmacy.  Lizy at [facility] and [address].  Pharmacy is telling patient that they did not receive the updated RX sent in on 2/21/19 and need a verbal from the office to change the RX. Pt is out of medication.     Pt also wanted to let the provider know that she had her labs done this am.    Please call the patient to let her know when the pharmacy has been called.

## 2019-03-08 RX ORDER — GABAPENTIN 100 MG/1
200 CAPSULE ORAL 3 TIMES DAILY
Qty: 180 CAP | Refills: 5 | Status: SHIPPED | OUTPATIENT
Start: 2019-03-08 | End: 2019-12-19 | Stop reason: SDUPTHER

## 2019-03-08 NOTE — TELEPHONE ENCOUNTER
Rx of gabapentin resent to her pharmacy.  Please double check with pharmacy to make sure they received the prescription and inform patient.  Thank you

## 2019-03-13 ENCOUNTER — HOSPITAL ENCOUNTER (OUTPATIENT)
Dept: RADIOLOGY | Facility: MEDICAL CENTER | Age: 54
End: 2019-03-13
Attending: FAMILY MEDICINE
Payer: COMMERCIAL

## 2019-03-13 DIAGNOSIS — I73.9 CLAUDICATION (HCC): ICD-10-CM

## 2019-03-13 DIAGNOSIS — R92.8 ABNORMAL MAMMOGRAM OF BOTH BREASTS: ICD-10-CM

## 2019-03-13 PROCEDURE — 77066 DX MAMMO INCL CAD BI: CPT

## 2019-03-13 PROCEDURE — 93925 LOWER EXTREMITY STUDY: CPT

## 2019-03-13 PROCEDURE — 93922 UPR/L XTREMITY ART 2 LEVELS: CPT

## 2019-03-14 DIAGNOSIS — R92.8 ABNORMAL MAMMOGRAM OF BOTH BREASTS: ICD-10-CM

## 2019-03-25 DIAGNOSIS — R23.2 HOT FLASHES: ICD-10-CM

## 2019-03-26 RX ORDER — GABAPENTIN 100 MG/1
CAPSULE ORAL
Qty: 90 CAP | Refills: 1 | OUTPATIENT
Start: 2019-03-26

## 2019-03-31 DIAGNOSIS — S46.002S INJURY OF LEFT ROTATOR CUFF, SEQUELA: ICD-10-CM

## 2019-04-01 RX ORDER — MELOXICAM 15 MG/1
TABLET ORAL
Qty: 90 TAB | Refills: 0 | Status: SHIPPED | OUTPATIENT
Start: 2019-04-01 | End: 2021-01-19

## 2019-05-02 ENCOUNTER — OFFICE VISIT (OUTPATIENT)
Dept: ENDOCRINOLOGY | Facility: MEDICAL CENTER | Age: 54
End: 2019-05-02
Payer: COMMERCIAL

## 2019-05-02 VITALS
DIASTOLIC BLOOD PRESSURE: 62 MMHG | HEART RATE: 70 BPM | SYSTOLIC BLOOD PRESSURE: 110 MMHG | WEIGHT: 107 LBS | HEIGHT: 63 IN | BODY MASS INDEX: 18.96 KG/M2

## 2019-05-02 DIAGNOSIS — G62.9 NEUROPATHY: ICD-10-CM

## 2019-05-02 DIAGNOSIS — E11.9 TYPE 2 DIABETES MELLITUS WITHOUT COMPLICATION, WITHOUT LONG-TERM CURRENT USE OF INSULIN (HCC): ICD-10-CM

## 2019-05-02 PROCEDURE — 99203 OFFICE O/P NEW LOW 30 MIN: CPT | Performed by: PHYSICIAN ASSISTANT

## 2019-05-02 NOTE — PROGRESS NOTES
New Patient Consult Note  Referred by: Cherelle Witt M.D.    Reason for consult: Diabetes Management Type 2    HPI:  Michelle Shaw is a 53 y.o. old patient who is seeing us today for diabetes care.  This is a pleasant patient with diabetes and I appreciate the opportunity to participate in the care of this patient.  This is a new patient with me today.    Labs of 3.7.19 GFR >60  Labs of 2/21/19 HbA1c is 5.3  Labs of 11/13/18 HbA1c as 6.5, microalbumin/ratio negative    BG Diary:5/2/2019  In the AM:  No log    Has been Diabetic since 6 months ago  Has a Glucagon pen at home: no    1. Type 2 diabetes mellitus without complication, without long-term current use of insulin ([facility])  This is a new patient with me on 5/2/2019  They are on:  1.  Metformin XR 500mg three times a day    ROS:   Constitutional: No change in weight , No fatigue, No night sweats.  HEENT: No Headache.  Eyes:  No blurred vision, No visual changes.  Cardiac: No chest pain, No palpitations.  Resp: No shortness of breath, No cough,   Gastro: No nausea or vomiting, No diarrhea.  Neuro: Denies numbness or tinging in bilateral feet or hands, and no loss of sensation.  Endo: No heat or cold intolerance.  : No polyuria, No polydipsia, No chronic UTI's.  Lower extremities: No lower leg edema bilateral.  All other systems were reviewed and were negative.    Past Medical History:  Patient Active Problem List    Diagnosis Date Noted   • Neuropathy ([facility]) 05/02/2019   • Abnormal mammogram of both breasts 12/06/2018   • Diabetic polyneuropathy associated with type 2 diabetes mellitus ([facility]) 12/03/2018   • Type 2 diabetes mellitus without complication, without long-term current use of insulin ([facility]) 11/14/2018   • Vitamin D insufficiency 11/14/2018   • Pure hypercholesterolemia 11/14/2018   • Hot flashes 11/05/2018   • Injury of left rotator cuff 08/23/2018   • Tobacco use 08/23/2018       Past Surgical History:  Past Surgical History:   Procedure Laterality Date  "  • SHOULDER ARTHROSCOPY Left 2018   • ACL REPAIR Bilateral    • APPENDECTOMY     • PRIMARY C SECTION     • TUBAL COAGULATION LAPAROSCOPIC BILATERAL         Allergies:  Aspirin and Penicillins    Social History:  Social History     Social History   • Marital status:      Spouse name: N/A   • Number of children: N/A   • Years of education: N/A     Occupational History   • Not on file.     Social History Main Topics   • Smoking status: Current Every Day Smoker     Packs/day: 0.50     Years: 30.00   • Smokeless tobacco: Never Used      Comment: 10 ciggs a day    • Alcohol use Yes      Comment: occass   • Drug use: No   • Sexual activity: No     Other Topics Concern   • Not on file     Social History Narrative   • No narrative on file       Family History:  Family History   Problem Relation Age of Onset   • Lung Disease Mother    • Heart Disease Mother    • Heart Disease Father    • Diabetes Father    • Hyperlipidemia Father    • No Known Problems Brother    • No Known Problems Brother        Medications:    Current Outpatient Prescriptions:   •  meloxicam (MOBIC) 15 MG tablet, TAKE 1 TABLET BY MOUTH EVERY DAY WITH FOOD, Disp: 90 Tab, Rfl: 0  •  gabapentin (NEURONTIN) 100 MG Cap, Take 2 Caps by mouth 3 times a day., Disp: 180 Cap, Rfl: 5  •  cilostazol (PLETAL) 50 MG tablet, Take 1 Tab by mouth 2 times a day., Disp: 60 Tab, Rfl: 1  •  metFORMIN ER (GLUCOPHAGE XR) 500 MG TABLET SR 24 HR, Take 1 Tab by mouth 3 times a day. (Patient taking differently: Take 500 mg by mouth 2 times a day.), Disp: 270 Tab, Rfl: 1  •  Lancets, Check blood sugar once daily and prn symptoms high or low sugar., Disp: 100 Each, Rfl: 2  •  Glucose Blood (BLOOD GLUCOSE TEST STRIPS) Strip, Test your blood sugar once daily and PRN for symptoms of high or low blood sugar, Disp: 100 Strip, Rfl: 2      Physical Examination:   Vital signs: /62 (BP Location: Left arm, Patient Position: Sitting)   Pulse 70   Ht 1.6 m (5' 3\")   Wt 48.5 kg " (107 lb)   LMP 12/03/2015 (Approximate)   BMI 18.95 kg/m²   General: No distress, cooperative, well dressed and well nourished.   Eyes: No scleral icterus or discharge, No hyposphagma  ENMT: Normal on external inspection of nose, lips, No nasal drainage   Neck: No abnormal masses on inspection  Resp: Normal effort, Bilateral clear to auscultation, No wheezing, No rales  CVS: Regular rate and rhythm, S1 S2 normal, No murmur. No gallop  Extremities: No edema bilateral extremities  Neuro: Alert and oriented  Skin: No rash, No Ulcers  Psych: Normal mood and affect      Assessment and Plan:    1. Type 2 diabetes mellitus without complication, without long-term current use of insulin (HCC)  She is under excellent control and has had Diabetes for less than a year.  It is not possible for her to have Diabetic Neuropathy    Metformin ER 500mg one a day and not three    She is having neuropathy in the lower heels into her calf muscles.  Refer to Toby Laguerre MD at [facility] for an evaluation    Lumbar xray ordered    Return in about 1 year (around 5/2/2020).      Thank you kindly for allowing me to participate in the diabetes care plan for this patient.    Sawyer Diaz PA-C, BC-ADM  Board Certified - Advanced Diabetes Management  05/02/19    CC:   Cherelle Witt M.D.

## 2019-05-07 ENCOUNTER — HOSPITAL ENCOUNTER (OUTPATIENT)
Dept: RADIOLOGY | Facility: MEDICAL CENTER | Age: 54
End: 2019-05-07
Attending: PHYSICIAN ASSISTANT
Payer: COMMERCIAL

## 2019-05-07 DIAGNOSIS — G62.9 NEUROPATHY: ICD-10-CM

## 2019-05-07 DIAGNOSIS — E11.9 TYPE 2 DIABETES MELLITUS WITHOUT COMPLICATION, WITHOUT LONG-TERM CURRENT USE OF INSULIN (HCC): ICD-10-CM

## 2019-05-07 PROCEDURE — 72100 X-RAY EXAM L-S SPINE 2/3 VWS: CPT

## 2019-05-08 ENCOUNTER — TELEPHONE (OUTPATIENT)
Dept: MEDICAL GROUP | Age: 54
End: 2019-05-08

## 2019-05-08 NOTE — TELEPHONE ENCOUNTER
· Disability paperwork received from [place] requiring provider signature.     · All appropriate fields completed by Medical Assistant: No    · Paperwork PAPER WORK SCANNED IN TO MEDIA AND SENT TO MEDICAL RECORDS

## 2019-06-04 ENCOUNTER — TELEPHONE (OUTPATIENT)
Dept: MEDICAL GROUP | Age: 54
End: 2019-06-04

## 2019-06-04 NOTE — TELEPHONE ENCOUNTER
1. Caller Name: Michelle Shaw                                           Call Back Number: [phone] (home)         Patient called and state she went to see her orthopedic doctor and is requesting a approval for 2nd rotator cuff surgery.     Is this a referral?     Please advise

## 2019-06-04 NOTE — TELEPHONE ENCOUNTER
1. Caller Name: Austen MA from [facility]                                 Call Back Number: [phone]        Jennifer left a voicemail needing our fax number to send a surgical clearance for Dr. Witt to sign for pt.    Left a voicemail to Jennifer providing our fax number    FYI

## 2019-06-05 ENCOUNTER — PATIENT MESSAGE (OUTPATIENT)
Dept: MEDICAL GROUP | Age: 54
End: 2019-06-05

## 2019-06-05 NOTE — TELEPHONE ENCOUNTER
Phone Number Called: [phone] (home)       Call outcome: left message for patient to call back regarding message below    Message: Advised patient to give us a call back to schedule appointment for surgical clearance.

## 2019-06-11 ENCOUNTER — OFFICE VISIT (OUTPATIENT)
Dept: MEDICAL GROUP | Age: 54
End: 2019-06-11
Payer: COMMERCIAL

## 2019-06-11 VITALS
HEIGHT: 63 IN | HEART RATE: 83 BPM | BODY MASS INDEX: 18.78 KG/M2 | DIASTOLIC BLOOD PRESSURE: 68 MMHG | OXYGEN SATURATION: 96 % | TEMPERATURE: 98.7 F | WEIGHT: 106 LBS | SYSTOLIC BLOOD PRESSURE: 112 MMHG

## 2019-06-11 DIAGNOSIS — Z01.818 PREOPERATIVE CLEARANCE: ICD-10-CM

## 2019-06-11 DIAGNOSIS — E11.9 TYPE 2 DIABETES MELLITUS WITHOUT COMPLICATION, WITHOUT LONG-TERM CURRENT USE OF INSULIN (HCC): Primary | ICD-10-CM

## 2019-06-11 DIAGNOSIS — S46.002S INJURY OF LEFT ROTATOR CUFF, SEQUELA: ICD-10-CM

## 2019-06-11 PROCEDURE — 99214 OFFICE O/P EST MOD 30 MIN: CPT | Performed by: FAMILY MEDICINE

## 2019-06-11 NOTE — PROGRESS NOTES
Michelle Shaw 53 y.o. female with history of ***, who has planned elective surgery on ***. Pt presents for pre-op evaluation.     Tobacco consumption within one year: smokes 5-10 cigs per day  Alcohol consumption: No   Hx of chronic lung disease (COPD, asthma, etc): No  Hx of cardiovascular disease: No  Hx of stroke: No  Hx of steroid use for chronic conditions: No  Hx of ascites 30 days prior to surgery: No  Hx of heart failure 30 days prior to surgery: No  Hx of renal failure: No  Hx of dialysis: No  Hx of disseminated cancer: No  HTN: No  DM: Yes  Independent status: No  Is the patient ventilator dependent?: No  Pt denies chest pain, SOB, ONTIVEROS, hemoptysis, hx of allergic reaction to anesthesia,

## 2019-06-11 NOTE — PROGRESS NOTES
Subjective:   CC: Preop evaluation    HPI:     Michelle Shaw is a 53 y.o. female who is an established patient of the clinic, presents with the following concerns:     The patient states she has history of chronic left shoulder pain.  She did receive surgical decompression of the AC joint and extensive debridement of the shoulder joint on her right shoulder in [place] on 7/31/18. However she has had continued pain since then despite activity modification and physical therapy and 6 steroid injections.  Patient is now working with [facility], Dr. Hamilton recommended repeat arthroscopy as recent MI of the left shoulder was notable for extensive tendinosis and tendinopathy.  There is also evidence of supraspinatus full-thickness tear.  She needs to be medically cleared by PCP per Dr. Vincent's request.. The surgery has not been scheduled yet.  Patient states that Dr. Hamilton did not explain the results of the recent MRI with her.  She would like to discuss the findings with me today.      Patient has history of type 2 diabetes.  She is currently taking metformin 5 mg twice daily.  Patient states she hasn't been paying attention to her diabetes due to her shoulder pain, however her last A1C was well controlled at 5.3% in February 2019.     Patient is due for a Pap smear, which we will schedule for later in the year.     Pre-op evaluation questionnaire:   Tobacco consumption within one year: smokes 5-10 cigs per day  Alcohol consumption: No   Hx of chronic lung disease (COPD, asthma, etc): No  Hx of cardiovascular disease: No  Hx of stroke: No  Hx of steroid use for chronic conditions: No  Hx of ascites 30 days prior to surgery: No  Hx of heart failure 30 days prior to surgery: No  Hx of renal failure: No  Hx of dialysis: No  Hx of disseminated cancer: No  HTN: No  DM: Yes  Independent status: No  Is the patient ventilator dependent?: No  Pt denies chest pain, SOB, ONTIVEROS, hemoptysis, hx of allergic reaction to anesthesia.    Current  "medicines (including changes today)  Current Outpatient Prescriptions   Medication Sig Dispense Refill   • meloxicam (MOBIC) 15 MG tablet TAKE 1 TABLET BY MOUTH EVERY DAY WITH FOOD 90 Tab 0   • gabapentin (NEURONTIN) 100 MG Cap Take 2 Caps by mouth 3 times a day. 180 Cap 5   • cilostazol (PLETAL) 50 MG tablet Take 1 Tab by mouth 2 times a day. 60 Tab 1   • metFORMIN ER (GLUCOPHAGE XR) 500 MG TABLET SR 24 HR Take 1 Tab by mouth 3 times a day. (Patient taking differently: Take 500 mg by mouth 2 times a day.) 270 Tab 1   • Lancets Check blood sugar once daily and prn symptoms high or low sugar. 100 Each 2   • Glucose Blood (BLOOD GLUCOSE TEST STRIPS) Strip Test your blood sugar once daily and PRN for symptoms of high or low blood sugar 100 Strip 2     No current facility-administered medications for this visit.      She  has a past medical history of Gestational diabetes.    I personally reviewed patient's problem list, allergies, medications, family hx, social hx with patient and update EPIC.     REVIEW OF SYSTEMS:  CONSTITUTIONAL:  Denies night sweats, fatigue, malaise, lethargy, fever or chills.  RESPIRATORY:  Denies cough, wheeze, hemoptysis, or shortness of breath.  CARDIOVASCULAR:  Denies chest pains, palpitations, pedal edema     Objective:     /68 (BP Location: Right arm, Patient Position: Sitting, BP Cuff Size: Adult)   Pulse 83   Temp 37.1 °C (98.7 °F) (Temporal)   Ht 1.6 m (5' 3\")   Wt 48.1 kg (106 lb)   SpO2 96%  Body mass index is 18.78 kg/m².    Physical Exam:  Constitutional: awake, alert, in no distress.  Skin: Warm, dry, good turgor, no rashes, bruises, ulcers in visible areas.  Eye: conjunctiva clear, lids neg for edema or lesions.  Neck: Trachea midline, no masses, no thyromegaly. No cervical or supraclavicular lymphadenopathy  Respiratory: Unlabored respiratory effort, lungs clear to auscultation, no wheezes, no rales.  Cardiovascular: Normal S1, S2, no murmur, no pedal edema.  Psych: " Oriented x3, affect and mood wnl, intact judgement and insight.       Assessment and Plan:   The following treatment plan was discussed    1. Type 2 diabetes mellitus without complication, without long-term current use of insulin (HCC)  New diagnosis, currently taking metformin 500 mg twice daily, her last A1c was 5.3.  Patient does not adhere to diabetic diet.  She is active, but unable to exercise regularly due to chronic right shoulder pain.  -Continue metformin 500 mg twice daily  - Discussed dietary modification, exercise, weight loss  - Annual eye exam  - Regular foot exam  - Discussed vaccines recommendations: PPSV 23 and hepatitis B.   - Side effects of all medications discussed with patient  - Follow up in 3 months.     2. Injury of left rotator cuff, sequela  3. Preoperative clearance  Patient has history of chronic left shoulder pain, status post subacromial decompression and extensive right shoulder debridement in July 2018 in [facility].  Unfortunately, she continues to suffer persistent left shoulder pain leading to decreased quality of life and unable to maintain employment.  Patient is now working with  at [facility].  Recent MRI was notable for severe tendinosis/tendinopathy of the distal anterior supraspinatus tendon with focal small full-thickness tear in the distal anterior supraspinatus tendon with associated small to moderate fluid in the subacromial bursa.  Repeat arthroscopy was recommended.  Patient presents today for preop clearance.  I review patient's chart and medical history.  There is no obvious contraindication to elective surgery.  Patient is therefore cleared for surgery.  She was advised to continue Celebrex as well as Tylenol as needed for pain.      Cherelle Witt M.D.    Followup: Return for As needed.    Please note that this dictation was created using voice recognition software and/or scribes. I have made every reasonable attempt to correct obvious errors, but I expect that there are  errors of grammar and possibly content that I did not discover before finalizing the note.     IIva (Scribe), am scribing for, and in the presence of, Cherelle Witt M.D.    Electronically signed by: Iva Toledo (Scribe), 6/11/2019    Cherelle CHRISTIANSON M.D. personally performed the services described in this documentation, as scribed by Iva Toledo in my presence, and it is both accurate and complete.

## 2019-07-30 ENCOUNTER — NON-PROVIDER VISIT (OUTPATIENT)
Dept: URGENT CARE | Facility: CLINIC | Age: 54
End: 2019-07-30

## 2019-07-30 DIAGNOSIS — Z02.1 PRE-EMPLOYMENT DRUG SCREENING: ICD-10-CM

## 2019-07-30 LAB
AMP AMPHETAMINE: NORMAL
BAR BARBITURATES: NORMAL
BZO BENZODIAZEPINES: NORMAL
COC COCAINE: NORMAL
INT CON NEG: NORMAL
INT CON POS: NORMAL
MDMA ECSTASY: NORMAL
MET METHAMPHETAMINES: NORMAL
MTD METHADONE: NORMAL
OPI OPIATES: NORMAL
OXY OXYCODONE: NORMAL
PCP PHENCYCLIDINE: NORMAL
POC URINE DRUG SCREEN OCDRS: NEGATIVE
THC: NORMAL

## 2019-07-30 PROCEDURE — 80305 DRUG TEST PRSMV DIR OPT OBS: CPT | Performed by: NURSE PRACTITIONER

## 2019-11-27 ENCOUNTER — PATIENT MESSAGE (OUTPATIENT)
Dept: MEDICAL GROUP | Age: 54
End: 2019-11-27

## 2019-12-02 DIAGNOSIS — E11.9 TYPE 2 DIABETES MELLITUS WITHOUT COMPLICATION, WITHOUT LONG-TERM CURRENT USE OF INSULIN (HCC): ICD-10-CM

## 2019-12-02 DIAGNOSIS — E78.00 PURE HYPERCHOLESTEROLEMIA: ICD-10-CM

## 2019-12-02 NOTE — TELEPHONE ENCOUNTER
Pt is due for diabetic follow up. Please schedule appointment. I will discuss the issue with animal support during her next OV. Please advise pt to do fasting blood tests prior to her next OV. Thanks.   Cherelle Witt M.D.

## 2019-12-03 NOTE — PATIENT COMMUNICATION
Phone Number Called: [phone] (home)     Call outcome: left message for patient to call back regarding message below

## 2019-12-12 ENCOUNTER — HOSPITAL ENCOUNTER (OUTPATIENT)
Dept: LAB | Facility: MEDICAL CENTER | Age: 54
End: 2019-12-12
Attending: FAMILY MEDICINE
Payer: COMMERCIAL

## 2019-12-12 DIAGNOSIS — E11.9 TYPE 2 DIABETES MELLITUS WITHOUT COMPLICATION, WITHOUT LONG-TERM CURRENT USE OF INSULIN (HCC): ICD-10-CM

## 2019-12-12 DIAGNOSIS — E78.00 PURE HYPERCHOLESTEROLEMIA: ICD-10-CM

## 2019-12-12 LAB
CHOLEST SERPL-MCNC: 166 MG/DL (ref 100–199)
EST. AVERAGE GLUCOSE BLD GHB EST-MCNC: 123 MG/DL
FASTING STATUS PATIENT QL REPORTED: NORMAL
HBA1C MFR BLD: 5.9 % (ref 0–5.6)
HDLC SERPL-MCNC: 51 MG/DL
LDLC SERPL CALC-MCNC: 96 MG/DL
TRIGL SERPL-MCNC: 95 MG/DL (ref 0–149)

## 2019-12-12 PROCEDURE — 83036 HEMOGLOBIN GLYCOSYLATED A1C: CPT

## 2019-12-12 PROCEDURE — 80061 LIPID PANEL: CPT

## 2019-12-12 PROCEDURE — 36415 COLL VENOUS BLD VENIPUNCTURE: CPT

## 2019-12-19 ENCOUNTER — OFFICE VISIT (OUTPATIENT)
Dept: MEDICAL GROUP | Age: 54
End: 2019-12-19
Payer: COMMERCIAL

## 2019-12-19 VITALS
TEMPERATURE: 97.8 F | OXYGEN SATURATION: 98 % | RESPIRATION RATE: 16 BRPM | WEIGHT: 110 LBS | DIASTOLIC BLOOD PRESSURE: 60 MMHG | SYSTOLIC BLOOD PRESSURE: 110 MMHG | HEIGHT: 63 IN | BODY MASS INDEX: 19.49 KG/M2 | HEART RATE: 88 BPM

## 2019-12-19 DIAGNOSIS — Z12.11 COLON CANCER SCREENING: ICD-10-CM

## 2019-12-19 DIAGNOSIS — R23.2 HOT FLASHES: ICD-10-CM

## 2019-12-19 DIAGNOSIS — E11.42 DIABETIC POLYNEUROPATHY ASSOCIATED WITH TYPE 2 DIABETES MELLITUS (HCC): ICD-10-CM

## 2019-12-19 DIAGNOSIS — S46.002S INJURY OF LEFT ROTATOR CUFF, SEQUELA: ICD-10-CM

## 2019-12-19 DIAGNOSIS — Z00.00 PE (PHYSICAL EXAM), ANNUAL: Primary | ICD-10-CM

## 2019-12-19 DIAGNOSIS — E78.00 PURE HYPERCHOLESTEROLEMIA: ICD-10-CM

## 2019-12-19 DIAGNOSIS — M75.02 ADHESIVE CAPSULITIS OF LEFT SHOULDER: ICD-10-CM

## 2019-12-19 DIAGNOSIS — Z23 NEED FOR VACCINATION: ICD-10-CM

## 2019-12-19 DIAGNOSIS — Z72.0 TOBACCO USE: ICD-10-CM

## 2019-12-19 DIAGNOSIS — E11.9 TYPE 2 DIABETES MELLITUS WITHOUT COMPLICATION, WITHOUT LONG-TERM CURRENT USE OF INSULIN (HCC): ICD-10-CM

## 2019-12-19 DIAGNOSIS — R92.8 ABNORMAL MAMMOGRAM OF BOTH BREASTS: ICD-10-CM

## 2019-12-19 PROBLEM — G62.9 NEUROPATHY: Status: RESOLVED | Noted: 2019-05-02 | Resolved: 2019-12-19

## 2019-12-19 PROBLEM — E55.9 VITAMIN D INSUFFICIENCY: Status: RESOLVED | Noted: 2018-11-14 | Resolved: 2019-12-19

## 2019-12-19 PROCEDURE — 90686 IIV4 VACC NO PRSV 0.5 ML IM: CPT | Performed by: FAMILY MEDICINE

## 2019-12-19 PROCEDURE — 90746 HEPB VACCINE 3 DOSE ADULT IM: CPT | Performed by: FAMILY MEDICINE

## 2019-12-19 PROCEDURE — 92250 FUNDUS PHOTOGRAPHY W/I&R: CPT | Mod: TC | Performed by: FAMILY MEDICINE

## 2019-12-19 PROCEDURE — 90471 IMMUNIZATION ADMIN: CPT | Performed by: FAMILY MEDICINE

## 2019-12-19 PROCEDURE — 90472 IMMUNIZATION ADMIN EACH ADD: CPT | Performed by: FAMILY MEDICINE

## 2019-12-19 PROCEDURE — 99396 PREV VISIT EST AGE 40-64: CPT | Mod: 25 | Performed by: FAMILY MEDICINE

## 2019-12-19 RX ORDER — PRAVASTATIN SODIUM 20 MG
20 TABLET ORAL
Qty: 90 TAB | Refills: 3 | Status: SHIPPED | OUTPATIENT
Start: 2019-12-19 | End: 2021-01-19

## 2019-12-19 RX ORDER — GABAPENTIN 300 MG/1
300 CAPSULE ORAL 3 TIMES DAILY
Qty: 270 CAP | Refills: 1 | Status: SHIPPED | OUTPATIENT
Start: 2019-12-19 | End: 2021-01-19

## 2019-12-19 RX ORDER — METFORMIN HYDROCHLORIDE 500 MG/1
500 TABLET, EXTENDED RELEASE ORAL 3 TIMES DAILY
Qty: 270 TAB | Refills: 1 | Status: SHIPPED | OUTPATIENT
Start: 2019-12-19 | End: 2021-01-19 | Stop reason: SDUPTHER

## 2019-12-19 RX ORDER — FLUOXETINE 10 MG/1
10 CAPSULE ORAL DAILY
Qty: 90 CAP | Refills: 1 | Status: SHIPPED | OUTPATIENT
Start: 2019-12-19 | End: 2021-01-19

## 2019-12-19 NOTE — PROGRESS NOTES
Subjective:   CC: Annual PE    HPI:     Diabetic polyneuropathy associated with type 2 diabetes mellitus (HCC)  The patient presents for follow up regarding a chronic complaint of lower extremity numbness and tingling. She was evaluated by an outside facility and diagnosed with diabettic neuropathy. Currently she is on gabapentin 200 mg TID. She states that this current dosage is not helping her pain. At present the patient's diabetes is well controled without the use of insulin.     Type 2 diabetes mellitus without complication, without long-term current use of insulin (HCC)  Chronic, currently taking metformin 500 mg 3 times daily.  She tolerated medication well, no side effect reported.  Denies any visual changes, concerning lesion on her feet, symptoms of polyneuropathy.    Injury of left rotator cuff, sequela  Adhesive capsulitis of left shoulder_[place]  The patient has history of chronic left shoulder pain secondary to rotator cuff injury.  She had a rotator cuff repair of her left shoulder on August 2018. She has followed up with physical therapy and is being followed by Dr. Hamilton.  She had repeat left shoulder arthroscopy by Dr. Hamilton in August 2019.  However, her symptoms fail to improve.  She was recently diagnosed with frozen shoulder. She states that, at present, physical therapy is not helping her. The patient is taking meloxicam and tylenol for her pain. She states that these medications are managing her pain at their current dosages.    Pure hypercholesterolemia  She continues to control her hypercholesterolemia through her own efforts. Labs were discussed with the patient.  Results for IZA YULIA COUCH VIGNESH (MRN [id number]) as of 12/19/2019 08:03   Ref. Range 12/12/2019 07:20   Cholesterol,Tot Latest Ref Range: 100 - 199 mg/dL 166   Triglycerides Latest Ref Range: 0 - 149 mg/dL 95   HDL Latest Ref Range: >=40 mg/dL 51   LDL Latest Ref Range: <100 mg/dL 96     Hot flashes  The patient reports with a  chronic complaint of hot flashes. She states that the gabapentin does not help control her condition. Additional to this she states that she has been trying melatonin with no alleviation.     Tobacco use  The patient endorses that she continues to smoke, but is not ready to take steps towards quitting at this time.    Abnormal mammogram of both breasts  The patient had an abnormal mammogram on 3/19.  Cluster of microcalcification was seen at 12:00 position bilaterally.  Follow-up diagnostic mammogram was not concerning for breast cancer.  Patient is due for repeat diagnostic mammogram in 6 months.  Patient will call to schedule appointment.    Current medicines (including changes today)  Current Outpatient Medications   Medication Sig Dispense Refill   • gabapentin (NEURONTIN) 300 MG Cap Take 1 Cap by mouth 3 times a day. 270 Cap 1   • FLUoxetine (PROZAC) 10 MG Cap Take 1 Cap by mouth every day. 90 Cap 1   • metFORMIN ER (GLUCOPHAGE XR) 500 MG TABLET SR 24 HR Take 1 Tab by mouth 3 times a day. 270 Tab 1   • pravastatin (PRAVACHOL) 20 MG Tab Take 1 Tab by mouth every bedtime. 90 Tab 3   • meloxicam (MOBIC) 15 MG tablet TAKE 1 TABLET BY MOUTH EVERY DAY WITH FOOD 90 Tab 0   • Lancets Check blood sugar once daily and prn symptoms high or low sugar. 100 Each 2   • Glucose Blood (BLOOD GLUCOSE TEST STRIPS) Strip Test your blood sugar once daily and PRN for symptoms of high or low blood sugar 100 Strip 2     No current facility-administered medications for this visit.      She  has a past medical history of Gestational diabetes.    I personally reviewed patient's problem list, allergies, medications, family hx, social hx with patient and update EPIC.     REVIEW OF SYSTEMS:  CONSTITUTIONAL:  Denies night sweats, fatigue, malaise, lethargy, fever or chills.  RESPIRATORY:  Denies cough, wheeze, hemoptysis, or shortness of breath.  CARDIOVASCULAR:  Denies chest pains, palpitations, pedal edema     Objective:     /60 (BP  "Location: Right arm, Patient Position: Sitting, BP Cuff Size: Adult)   Pulse 88   Temp 36.6 °C (97.8 °F) (Temporal)   Resp 16   Ht 1.6 m (5' 3\")   Wt 49.9 kg (110 lb)   SpO2 98%  Body mass index is 19.49 kg/m².    Physical Exam:  Constitutional: awake, alert, in no distress.  Skin: Warm, dry, good turgor, no rashes, bruises, ulcers in visible areas.  Eye: conjunctiva clear, lids neg for edema or lesions.  ENMT: TM and auditory canals wnl. Oral and nasal mucosa wnl. Lips without lesions, good dentition, oropharynx clear.  Neck: Trachea midline, no masses, no thyromegaly. No cervical or supraclavicular lymphadenopathy  Respiratory: Unlabored respiratory effort, lungs clear to auscultation, no wheezes, no rales.  Cardiovascular: Normal S1, S2, no murmur, no pedal edema.  Psych: Oriented x3, affect and mood wnl, intact judgement and insight.     Monofilament testing with a 10 gram force: sensation intact: intact bilaterally  Visual Inspection: Feet without maceration, ulcers, fissures.  Pedal pulses: intact bilaterally     Assessment and Plan:   The following treatment plan was discussed    1. Diabetic polyneuropathy associated with type 2 diabetes mellitus (HCC)  Chronic, persistent diabetic polyneuropathy affecting bilateral feet.  She is taking gabapentin 2 to milligrams 3 times daily without significant relief.  Plans:  -Increase gabapentin to 300 mg 3 times daily: Gabapentin (NEURONTIN) 300 MG Cap; Take 1 Cap by mouth 3 times a day.  Dispense: 270 Cap; Refill: 1    2. Type 2 diabetes mellitus without complication, without long-term current use of insulin (HCC)  Chronic, controlled with metformin 500 mg 3 times daily, last A1c was 5.9.  She denies any visual changes, concerning lesion on her feet.  She has diabetic polyneuropathy addressed above.  - metFORMIN ER (GLUCOPHAGE XR) 500 MG TABLET SR 24 HR; Take 1 Tab by mouth 3 times a day.  Dispense: 270 Tab; Refill: 1  - Discussed dietary modification, exercise, " weight loss  - Annual eye exam  - Regular foot exam  - Discussed prevention and management of hypoglycemia  - Side effects of all medications discussed with patient  - Follow up in 6 months.   - HEMOGLOBIN A1C; Future  - Comp Metabolic Panel; Future  - CBC WITH DIFFERENTIAL; Future  - MICROALBUMIN CREAT RATIO URINE; Future  - POCT Retinal Eye Exam  - Diabetic Monofilament Lower Extremity Exam    3. Injury of left rotator cuff, sequela  4. Adhesive capsulitis of left shoulder_[place]  Patient has history of left rotator cuff injury, status post rotator cuff repair in August 2018.  She continues to have persistent pain and dysfunction of the left shoulder despite physical therapy.  She again had left shoulder arthroscopy in August 2019.  However, there was no improvement with the second surgery.  Patient continue to work with physical therapy for frozen shoulder.  She is taking Tylenol and meloxicam as needed for pain.  This condition is currently being managed by Dr. Vincent  -Follow-up with Dr. Vincent as directed  -Continue physical therapy  -Continue meloxicam and Tylenol as needed for pain    5. Pure hypercholesterolemia  - Lipid Profile; Future  - pravastatin (PRAVACHOL) 20 MG Tab; Take 1 Tab by mouth every bedtime.  Dispense: 90 Tab; Refill: 3  - recommended dietary modification, exercise, and weight loss.      6. Hot flashes  - gabapentin (NEURONTIN) 300 MG Cap; Take 1 Cap by mouth 3 times a day.  Dispense: 270 Cap; Refill: 1  - FLUoxetine (PROZAC) 10 MG Cap; Take 1 Cap by mouth every day.  Dispense: 90 Cap; Refill: 1    7. Tobacco use  - I discussed risks and health complications of chronic tobacco abuse. Recommended tobacco cessation. I offered pharmacotherapy for smoking cessation if needed.      8. Abnormal mammogram of both breasts  Microcalcification was seen in both breasts per diagnostic mammogram and screening mammogram done in the spring 2019.  Patient is due to have repeat diagnostic mammogram for  surveillance.  Patient will call for appointment.  She denies any active breast symptoms.    9. Need for vaccination  - Hepatitis B Vaccine Adult IM  - Influenza Vaccine Quad Injection (PF)    10. Colon cancer screening  - OCCULT BLOOD FECES IMMUNOASSAY (FIT); Future    11. PE (physical exam), annual  General counseling provided, topics might include: diet, exercise, vitamin supplement, mental health, sleep, stress management, pap, mammogram, colonoscopy and vaccine recommendations.          Cherelle Witt M.D.    Followup: Return in about 6 months (around 6/19/2020) for Multiple issues.    Please note that this dictation was created using voice recognition software and/or scribes. I have made every reasonable attempt to correct obvious errors, but I expect that there are errors of grammar and possibly content that I did not discover before finalizing the note.     Won CHRISTIANSON (Abiibe), am scribing for, and in the presence of, Cherelle Witt M.D.    Electronically signed by: Won Jay (Abiibe), 12/19/2019    ICherelle M.D. personally performed the services described in this documentation, as scribed by Won Jay in my presence, and it is both accurate and complete.

## 2019-12-19 NOTE — LETTER
December 19, 2019        Re: Michelle Shaw    To whom it may concern,    My name is Cherelle Witt MD. I am taking care of Michelle Shaw, who is suffering multiple chronic medical problems that affect her mental health. Studies have shown that animals can provide great support to patients with mental health problems and allow them to feel well. Please allow Michelle to keep her cat (1) in her residence for this purpose.     If you have any questions, please do not hesitate to call my office.     Best regards,               Cherelle Witt M.D.

## 2020-01-03 ENCOUNTER — OFFICE VISIT (OUTPATIENT)
Dept: MEDICAL GROUP | Age: 55
End: 2020-01-03
Payer: COMMERCIAL

## 2020-01-03 VITALS
HEART RATE: 88 BPM | OXYGEN SATURATION: 98 % | SYSTOLIC BLOOD PRESSURE: 110 MMHG | HEIGHT: 63 IN | DIASTOLIC BLOOD PRESSURE: 60 MMHG | WEIGHT: 105 LBS | TEMPERATURE: 97.8 F | RESPIRATION RATE: 16 BRPM | BODY MASS INDEX: 18.61 KG/M2

## 2020-01-03 DIAGNOSIS — S46.002S INJURY OF LEFT ROTATOR CUFF, SEQUELA: Primary | ICD-10-CM

## 2020-01-03 DIAGNOSIS — Z02.9 ADMINISTRATIVE ENCOUNTER: ICD-10-CM

## 2020-01-03 PROCEDURE — 99214 OFFICE O/P EST MOD 30 MIN: CPT | Performed by: FAMILY MEDICINE

## 2020-01-03 ASSESSMENT — PATIENT HEALTH QUESTIONNAIRE - PHQ9
SUM OF ALL RESPONSES TO PHQ QUESTIONS 1-9: 10
5. POOR APPETITE OR OVEREATING: 3 - NEARLY EVERY DAY
CLINICAL INTERPRETATION OF PHQ2 SCORE: 1

## 2020-01-04 NOTE — PROGRESS NOTES
Subjective:   CC: Left rotator cuff injury    HPI:     Michelle Shaw is a 54 y.o. female who is an established patient of the clinic, presents with the following concerns:      Patient has history of L rotator cuff injury in August 2018. She is s/p left shoulder arthroscopy in [facility] in 8/2018 (left shoulder subacromial decompression and extensive debridement), now f/u with Dr. Hamilton ([facility]). She had arthoscopy done again by Dr. Hamilton in 8/2019 without significant improvement.  She continues to work with PT  and is currently followed by Dr. Vincent ([facility]). She presents today for disability documentation. She she had previously worked at an [employer], and is not currently working because of the injury. The patient has been receiving physical therapy 3 times a week, however symptoms are not improving. Cold weather is causing increased tightness of her shoulder. The patient is taking meloxicam and tylenol for pain management, and denies any s/e.       Current medicines (including changes today)  Current Outpatient Medications   Medication Sig Dispense Refill   • gabapentin (NEURONTIN) 300 MG Cap Take 1 Cap by mouth 3 times a day. 270 Cap 1   • FLUoxetine (PROZAC) 10 MG Cap Take 1 Cap by mouth every day. 90 Cap 1   • metFORMIN ER (GLUCOPHAGE XR) 500 MG TABLET SR 24 HR Take 1 Tab by mouth 3 times a day. 270 Tab 1   • pravastatin (PRAVACHOL) 20 MG Tab Take 1 Tab by mouth every bedtime. 90 Tab 3   • meloxicam (MOBIC) 15 MG tablet TAKE 1 TABLET BY MOUTH EVERY DAY WITH FOOD 90 Tab 0   • Lancets Check blood sugar once daily and prn symptoms high or low sugar. 100 Each 2   • Glucose Blood (BLOOD GLUCOSE TEST STRIPS) Strip Test your blood sugar once daily and PRN for symptoms of high or low blood sugar 100 Strip 2     No current facility-administered medications for this visit.      She  has a past medical history of Gestational diabetes.    I personally reviewed patient's problem list, allergies, medications, family hx,  "social hx with patient and update [facility].     REVIEW OF SYSTEMS:  CONSTITUTIONAL:  Denies night sweats, fatigue, malaise, lethargy, fever or chills.  RESPIRATORY:  Denies cough, wheeze, hemoptysis, or shortness of breath.  CARDIOVASCULAR:  Denies chest pains, palpitations, pedal edema     Objective:     /60 (BP Location: Right arm, Patient Position: Sitting, BP Cuff Size: Adult)   Pulse 88   Temp 36.6 °C (97.8 °F) (Temporal)   Resp 16   Ht 1.6 m (5' 3\")   Wt 47.6 kg (105 lb)   SpO2 98%  Body mass index is 18.6 kg/m².    Physical Exam:  Constitutional: awake, alert, in no distress.  Skin: Warm, dry, good turgor, no rashes, bruises, ulcers in visible areas.  Neck: Trachea midline, no masses, no thyromegaly. No cervical or supraclavicular lymphadenopathy  Respiratory: Unlabored respiratory effort, lungs clear to auscultation, no wheezes, no rales.  Cardiovascular: Normal S1, S2, no murmur, no pedal edema.  Psych: Oriented x3, affect and mood wnl, intact judgement and insight.       Assessment and Plan:   The following treatment plan was discussed    1. Injury of left rotator cuff, sequela  2. Administrative encounter  Patient has history of L rotator cuff injury in August 2018. She is s/p left shoulder arthroscopy in [facility] in 8/2018 (left shoulder subacromial decompression and extensive debridement), now f/u with Dr. Hamiltno ([facility]). She had arthoscopy done again by Dr. Hamilton in 8/2019 without significant improvement.  She continues to work with PT 3 times per week without significant improvement.  Patient is taking Tylenol and meloxicam as needed for pain.  Patient used to work for [employer].  She has been on disability since the injury.  She presents today for disability forms to be filled out as Dr. Hamilton declined to complete this form for her.  -Disability form filled out and scanned into patient's chart  -Continue physical therapy  -Continue meloxicam and Tylenol as needed  -Follow-up with Dr. Hamilton " as directed.    Patient was seen for 25 minutes face to face of which greater than 50% of appointment time was spent on counseling and coordination of care regarding the above     Cherelle Witt M.D.    Followup: Return for As needed.    Please note that this dictation was created using voice recognition software and/or scribes. I have made every reasonable attempt to correct obvious errors, but I expect that there are errors of grammar and possibly content that I did not discover before finalizing the note.     Yareli CHRISTIANSON (Scribe), am scribing for, and in the presence of, Cherelle Witt M.D.    Electronically signed by: Yareli Zazueta (Abiibe), 1/3/2020    Cherelle CHRISTIANSON M.D. personally performed the services described in this documentation, as scribed by Yareli Zazueta in my presence, and it is both accurate and complete.

## 2020-01-08 LAB — RETINAL SCREEN: NEGATIVE

## 2020-02-05 ENCOUNTER — OFFICE VISIT (OUTPATIENT)
Dept: URGENT CARE | Facility: CLINIC | Age: 55
End: 2020-02-05
Payer: COMMERCIAL

## 2020-02-05 VITALS
SYSTOLIC BLOOD PRESSURE: 100 MMHG | TEMPERATURE: 97 F | RESPIRATION RATE: 18 BRPM | DIASTOLIC BLOOD PRESSURE: 56 MMHG | HEIGHT: 63 IN | BODY MASS INDEX: 18.61 KG/M2 | HEART RATE: 100 BPM | OXYGEN SATURATION: 97 % | WEIGHT: 105 LBS

## 2020-02-05 DIAGNOSIS — J04.0 LARYNGITIS, ACUTE: ICD-10-CM

## 2020-02-05 LAB
INT CON NEG: NORMAL
INT CON POS: NORMAL
S PYO AG THROAT QL: NEGATIVE

## 2020-02-05 PROCEDURE — 99213 OFFICE O/P EST LOW 20 MIN: CPT | Performed by: FAMILY MEDICINE

## 2020-02-05 PROCEDURE — 87880 STREP A ASSAY W/OPTIC: CPT | Performed by: FAMILY MEDICINE

## 2020-02-05 ASSESSMENT — ENCOUNTER SYMPTOMS
FEVER: 0
SHORTNESS OF BREATH: 0
COUGH: 1
CHILLS: 0

## 2020-02-05 NOTE — LETTER
February 5, 2020         Patient: Michelle Shaw   YOB: 1965   Date of Visit: 2/5/2020           To Whom it May Concern:    Michelle Shaw was seen in my clinic on 2/5/2020. She may return to work on 2/8/2020..    If you have any questions or concerns, please don't hesitate to call.        Sincerely,           Flash Quarles M.D.  Electronically Signed

## 2020-02-05 NOTE — PROGRESS NOTES
"Subjective:   Michelle Shaw  is a 54 y.o. female who presents for Cough (x2days, cough, sinus drainage, loss of voice, cannot talk)        Cough   This is a new problem. The current episode started in the past 7 days. The problem has been gradually worsening. The problem occurs every few minutes. The cough is productive of sputum. Associated symptoms include nasal congestion. Pertinent negatives include no chills, fever or shortness of breath.     Review of Systems   Constitutional: Negative for chills and fever.   Respiratory: Positive for cough. Negative for shortness of breath.      Allergies   Allergen Reactions   • Aspirin      Doesn't know what happens mother told her she is allergic    • Penicillins      Doesn't know what happens mother told her she is allergic      Objective:   /56 (BP Location: Left arm, Patient Position: Sitting, BP Cuff Size: Small infant)   Pulse 100   Temp 36.1 °C (97 °F) (Temporal)   Resp 18   Ht 1.6 m (5' 3\")   Wt 47.6 kg (105 lb)   LMP 12/03/2015 (Approximate)   SpO2 97%   BMI 18.60 kg/m²   Physical Exam  Constitutional:       General: She is not in acute distress.     Appearance: She is well-developed.   HENT:      Head: Normocephalic and atraumatic.   Eyes:      Conjunctiva/sclera: Conjunctivae normal.      Pupils: Pupils are equal, round, and reactive to light.   Cardiovascular:      Rate and Rhythm: Normal rate and regular rhythm.      Heart sounds: No murmur.   Pulmonary:      Effort: Pulmonary effort is normal. No respiratory distress.      Breath sounds: Normal breath sounds.   Abdominal:      General: There is no distension.      Palpations: Abdomen is soft.      Tenderness: There is no tenderness.   Skin:     General: Skin is warm and dry.   Neurological:      Mental Status: She is alert and oriented to person, place, and time.      Sensory: No sensory deficit.      Deep Tendon Reflexes: Reflexes are normal and symmetric.           Assessment/Plan:   1. " Laryngitis, acute  - POCT Rapid Strep A  OTC fever reducer like ibuprofen or tylenol PRN fever per 's directions   Differential diagnosis, natural history, supportive care, and indications for immediate follow-up discussed.

## 2021-01-08 ENCOUNTER — HOSPITAL ENCOUNTER (OUTPATIENT)
Dept: LAB | Facility: MEDICAL CENTER | Age: 56
End: 2021-01-08
Attending: FAMILY MEDICINE
Payer: COMMERCIAL

## 2021-01-08 DIAGNOSIS — Z00.00 PE (PHYSICAL EXAM), ANNUAL: ICD-10-CM

## 2021-01-08 LAB
25(OH)D3 SERPL-MCNC: 28 NG/ML (ref 30–100)
ALBUMIN SERPL BCP-MCNC: 4.1 G/DL (ref 3.2–4.9)
ALBUMIN/GLOB SERPL: 1.7 G/DL
ALP SERPL-CCNC: 129 U/L (ref 30–99)
ALT SERPL-CCNC: 141 U/L (ref 2–50)
ANION GAP SERPL CALC-SCNC: 11 MMOL/L (ref 7–16)
AST SERPL-CCNC: 43 U/L (ref 12–45)
BASOPHILS # BLD AUTO: 0.8 % (ref 0–1.8)
BASOPHILS # BLD: 0.07 K/UL (ref 0–0.12)
BILIRUB SERPL-MCNC: 0.2 MG/DL (ref 0.1–1.5)
BUN SERPL-MCNC: 20 MG/DL (ref 8–22)
CALCIUM SERPL-MCNC: 9 MG/DL (ref 8.5–10.5)
CHLORIDE SERPL-SCNC: 105 MMOL/L (ref 96–112)
CHOLEST SERPL-MCNC: 210 MG/DL (ref 100–199)
CO2 SERPL-SCNC: 28 MMOL/L (ref 20–33)
CREAT SERPL-MCNC: 0.74 MG/DL (ref 0.5–1.4)
CREAT UR-MCNC: 73.48 MG/DL
EOSINOPHIL # BLD AUTO: 0.21 K/UL (ref 0–0.51)
EOSINOPHIL NFR BLD: 2.4 % (ref 0–6.9)
ERYTHROCYTE [DISTWIDTH] IN BLOOD BY AUTOMATED COUNT: 42.5 FL (ref 35.9–50)
EST. AVERAGE GLUCOSE BLD GHB EST-MCNC: 120 MG/DL
FASTING STATUS PATIENT QL REPORTED: NORMAL
GLOBULIN SER CALC-MCNC: 2.4 G/DL (ref 1.9–3.5)
GLUCOSE SERPL-MCNC: 108 MG/DL (ref 65–99)
HBA1C MFR BLD: 5.8 % (ref 0–5.6)
HCT VFR BLD AUTO: 44.9 % (ref 37–47)
HDLC SERPL-MCNC: 58 MG/DL
HGB BLD-MCNC: 14.3 G/DL (ref 12–16)
IMM GRANULOCYTES # BLD AUTO: 0.1 K/UL (ref 0–0.11)
IMM GRANULOCYTES NFR BLD AUTO: 1.1 % (ref 0–0.9)
LDLC SERPL CALC-MCNC: 133 MG/DL
LYMPHOCYTES # BLD AUTO: 2.56 K/UL (ref 1–4.8)
LYMPHOCYTES NFR BLD: 28.9 % (ref 22–41)
MCH RBC QN AUTO: 29.2 PG (ref 27–33)
MCHC RBC AUTO-ENTMCNC: 31.8 G/DL (ref 33.6–35)
MCV RBC AUTO: 91.8 FL (ref 81.4–97.8)
MICROALBUMIN UR-MCNC: 1.4 MG/DL
MICROALBUMIN/CREAT UR: 19 MG/G (ref 0–30)
MONOCYTES # BLD AUTO: 0.77 K/UL (ref 0–0.85)
MONOCYTES NFR BLD AUTO: 8.7 % (ref 0–13.4)
NEUTROPHILS # BLD AUTO: 5.16 K/UL (ref 2–7.15)
NEUTROPHILS NFR BLD: 58.1 % (ref 44–72)
NRBC # BLD AUTO: 0 K/UL
NRBC BLD-RTO: 0 /100 WBC
PLATELET # BLD AUTO: 197 K/UL (ref 164–446)
PMV BLD AUTO: 9.7 FL (ref 9–12.9)
POTASSIUM SERPL-SCNC: 4.4 MMOL/L (ref 3.6–5.5)
PROT SERPL-MCNC: 6.5 G/DL (ref 6–8.2)
RBC # BLD AUTO: 4.89 M/UL (ref 4.2–5.4)
SODIUM SERPL-SCNC: 144 MMOL/L (ref 135–145)
TRIGL SERPL-MCNC: 94 MG/DL (ref 0–149)
WBC # BLD AUTO: 8.9 K/UL (ref 4.8–10.8)

## 2021-01-08 PROCEDURE — 36415 COLL VENOUS BLD VENIPUNCTURE: CPT

## 2021-01-08 PROCEDURE — 82570 ASSAY OF URINE CREATININE: CPT

## 2021-01-08 PROCEDURE — 83036 HEMOGLOBIN GLYCOSYLATED A1C: CPT

## 2021-01-08 PROCEDURE — 85025 COMPLETE CBC W/AUTO DIFF WBC: CPT

## 2021-01-08 PROCEDURE — 80061 LIPID PANEL: CPT

## 2021-01-08 PROCEDURE — 80053 COMPREHEN METABOLIC PANEL: CPT

## 2021-01-08 PROCEDURE — 82043 UR ALBUMIN QUANTITATIVE: CPT

## 2021-01-19 ENCOUNTER — TELEMEDICINE (OUTPATIENT)
Dept: MEDICAL GROUP | Age: 56
End: 2021-01-19
Payer: COMMERCIAL

## 2021-01-19 VITALS — BODY MASS INDEX: 18.61 KG/M2 | WEIGHT: 105 LBS | HEIGHT: 63 IN

## 2021-01-19 DIAGNOSIS — E11.9 TYPE 2 DIABETES MELLITUS WITHOUT COMPLICATION, WITHOUT LONG-TERM CURRENT USE OF INSULIN (HCC): Primary | ICD-10-CM

## 2021-01-19 DIAGNOSIS — Z12.11 COLON CANCER SCREENING: ICD-10-CM

## 2021-01-19 DIAGNOSIS — G89.29 CHRONIC NECK PAIN: ICD-10-CM

## 2021-01-19 DIAGNOSIS — E11.42 DIABETIC POLYNEUROPATHY ASSOCIATED WITH TYPE 2 DIABETES MELLITUS (HCC): ICD-10-CM

## 2021-01-19 DIAGNOSIS — R92.1 BREAST CALCIFICATIONS: ICD-10-CM

## 2021-01-19 DIAGNOSIS — E78.00 PURE HYPERCHOLESTEROLEMIA: ICD-10-CM

## 2021-01-19 DIAGNOSIS — R23.2 HOT FLASHES: ICD-10-CM

## 2021-01-19 DIAGNOSIS — Z12.31 ENCOUNTER FOR SCREENING MAMMOGRAM FOR BREAST CANCER: ICD-10-CM

## 2021-01-19 DIAGNOSIS — M54.2 CHRONIC NECK PAIN: ICD-10-CM

## 2021-01-19 PROCEDURE — 99214 OFFICE O/P EST MOD 30 MIN: CPT | Mod: 95,CR | Performed by: FAMILY MEDICINE

## 2021-01-19 RX ORDER — GABAPENTIN 300 MG/1
300 CAPSULE ORAL 3 TIMES DAILY
Qty: 270 CAP | Refills: 1 | Status: SHIPPED | OUTPATIENT
Start: 2021-01-19

## 2021-01-19 RX ORDER — ATORVASTATIN CALCIUM 20 MG/1
20 TABLET, FILM COATED ORAL EVERY EVENING
Qty: 90 TAB | Refills: 1 | Status: SHIPPED | OUTPATIENT
Start: 2021-01-19

## 2021-01-19 RX ORDER — CYCLOBENZAPRINE HCL 5 MG
TABLET ORAL
COMMUNITY
Start: 2021-01-09

## 2021-01-19 RX ORDER — METFORMIN HYDROCHLORIDE 500 MG/1
500 TABLET, EXTENDED RELEASE ORAL 2 TIMES DAILY
Qty: 180 TAB | Refills: 1 | Status: SHIPPED | OUTPATIENT
Start: 2021-01-19

## 2021-01-19 RX ORDER — DICLOFENAC SODIUM 75 MG/1
TABLET, DELAYED RELEASE ORAL
COMMUNITY
Start: 2021-01-06

## 2021-01-19 RX ORDER — GABAPENTIN 300 MG/1
300 CAPSULE ORAL 3 TIMES DAILY
COMMUNITY
End: 2021-01-19 | Stop reason: SDUPTHER

## 2021-01-19 SDOH — HEALTH STABILITY: MENTAL HEALTH: HOW OFTEN DO YOU HAVE A DRINK CONTAINING ALCOHOL?: MONTHLY OR LESS

## 2021-01-19 ASSESSMENT — PATIENT HEALTH QUESTIONNAIRE - PHQ9: CLINICAL INTERPRETATION OF PHQ2 SCORE: 0

## 2021-01-19 ASSESSMENT — FIBROSIS 4 INDEX: FIB4 SCORE: 0.99

## 2021-01-20 NOTE — PROGRESS NOTES
Virtual Visit: Established Patient   This visit was conducted via Zoom using secure and encrypted videoconferencing technology. The patient was in a private location in the state of [place].    The patient's identity was confirmed and verbal consent was obtained for this virtual visit.    Subjective:   CC: DM follow up     Michelle Shaw is a 55 y.o. female with chronic, controlled type 2 diabetes, diabetic polyneuropathy, hyperlipidemia.  Patient has been taking all medication as directed.  She tolerated medication well, no side effect reported.  Patient was recently diagnosed with chronic neck pain.  She is working with [facility].  She was advised to take gabapentin 300 mg 3 times daily for symptoms.  Patient normally takes gabapentin at night for hot flashes, which is helping with her vasomotor symptoms.    Patient has history of benign breast calcification.  She is due for mammogram.  She denies any active breast symptoms at this time.  Negative personal or familial history of breast cancer.      ROS   Denies any recent fevers or chills. No nausea or vomiting. No chest pains or shortness of breath.     Allergies   Allergen Reactions   • Aspirin      Doesn't know what happens mother told her she is allergic    • Penicillins      Doesn't know what happens mother told her she is allergic       Current medicines (including changes today)  Current Outpatient Medications   Medication Sig Dispense Refill   • diclofenac DR (VOLTAREN) 75 MG Tablet Delayed Response      • cyclobenzaprine (FLEXERIL) 5 mg tablet      • atorvastatin (LIPITOR) 20 MG Tab Take 1 Tab by mouth every evening. 90 Tab 1   • gabapentin (NEURONTIN) 300 MG Cap Take 1 Cap by mouth 3 times a day. 270 Cap 1   • metFORMIN ER (GLUCOPHAGE XR) 500 MG TABLET SR 24 HR Take 1 Tab by mouth 2 times a day. 180 Tab 1   • Lancets Check blood sugar once daily and prn symptoms high or low sugar. 100 Each 2   • Glucose Blood (BLOOD GLUCOSE TEST STRIPS) Strip Test  "your blood sugar once daily and PRN for symptoms of high or low blood sugar 100 Strip 2     No current facility-administered medications for this visit.        Patient Active Problem List    Diagnosis Date Noted   • Chronic neck pain_[facility] [place] 01/19/2021   • Breast calcifications 12/06/2018   • Diabetic polyneuropathy associated with type 2 diabetes mellitus (HCC) 12/03/2018   • Type 2 diabetes mellitus without complication, without long-term current use of insulin (HCC) 11/14/2018   • Pure hypercholesterolemia 11/14/2018   • Hot flashes 11/05/2018   • Injury of left rotator cuff 08/23/2018   • Tobacco use 08/23/2018       Family History   Problem Relation Age of Onset   • Lung Disease Mother    • Heart Disease Mother    • Heart Disease Father    • Diabetes Father    • Hyperlipidemia Father    • No Known Problems Brother    • No Known Problems Brother        She  has a past medical history of Gestational diabetes.  She  has a past surgical history that includes acl repair (Bilateral); shoulder arthroscopy (Left, 2018); appendectomy; tubal coagulation laparoscopic bilateral; and primary c section.       Objective:   Ht 1.6 m (5' 3\")   Wt 47.6 kg (105 lb) Comment: pt stated  LMP 12/03/2015 (Approximate)   BMI 18.60 kg/m²     Physical Exam:  Constitutional: Alert, no distress, well-groomed.  Skin: No rashes in visible areas.  Eye: Round. Conjunctiva clear, lids normal. No icterus.   ENMT: Lips pink without lesions, good dentition, moist mucous membranes. Phonation normal.  Neck: No masses, no thyromegaly. Moves freely without pain.  Respiratory: Unlabored respiratory effort, no cough or audible wheeze  Psych: Alert and oriented x3, normal affect and mood.       Assessment and Plan:   The following treatment plan was discussed:     1. Type 2 diabetes mellitus without complication, without long-term current use of insulin (HCC)  Chronic, currently taking Metformin 500 mg 3 times daily.  Her last A1c was 5.8.  " Patient tolerated medication well, no side effect reported.   - HEMOGLOBIN A1C; Future  - Comp Metabolic Panel; Future  - CBC WITH DIFFERENTIAL; Future  - MICROALBUMIN CREAT RATIO URINE; Future  - metFORMIN ER (GLUCOPHAGE XR) 500 MG TABLET SR 24 HR; Take 1 Tab by mouth 2 times a day.  Dispense: 180 Tab; Refill: 1  - dietary modification, exercise, weight loss  - Annual eye exam  - Regular foot exam  - Follow up in 6 months.     2. Diabetic polyneuropathy associated with type 2 diabetes mellitus (HCC)  - Gabapentin 300 mg TID.     3. Pure hypercholesterolemia  Recent labs showed worsening lipid profile.   Given hx of type 2 DM, recommended statin.   - atorvastatin (LIPITOR) 20 MG Tab; Take 1 Tab by mouth every evening.  Dispense: 90 Tab; Refill: 1  - Lipid Profile; Future  - dietary modification, exercise   - avoid alcohol, drugs, tobacco products     4. Chronic neck pain_[facility] [place]  - gabapentin (NEURONTIN) 300 MG Cap; Take 1 Cap by mouth 3 times a day.  Dispense: 270 Cap; Refill: 1  - f/u with [facility] [place] as directed.     5. Encounter for screening mammogram for breast cancer  - MA-SCREENING MAMMO BILAT W/CAD; Future    6. Breast calcifications  - MA-SCREENING MAMMO BILAT W/CAD; Future    7. Hot flashes  - gabapentin (NEURONTIN) 300 MG Cap; Take 1 Cap by mouth 3 times a day.  Dispense: 270 Cap; Refill: 1    8. Colon cancer screening  - OCCULT BLOOD FECES IMMUNOASSAY (FIT); Future     Pt declined pap, flu/shingrix vaccines.     Follow-up: Return in about 6 months (around 7/19/2021) for Diabetes.

## 2021-01-22 ENCOUNTER — HOSPITAL ENCOUNTER (OUTPATIENT)
Dept: RADIOLOGY | Facility: MEDICAL CENTER | Age: 56
End: 2021-01-22
Attending: FAMILY MEDICINE
Payer: COMMERCIAL

## 2021-01-22 DIAGNOSIS — Z12.31 ENCOUNTER FOR SCREENING MAMMOGRAM FOR BREAST CANCER: ICD-10-CM

## 2021-01-22 DIAGNOSIS — R92.1 BREAST CALCIFICATIONS: ICD-10-CM

## 2021-01-22 PROCEDURE — 77063 BREAST TOMOSYNTHESIS BI: CPT

## 2021-01-24 ENCOUNTER — HOSPITAL ENCOUNTER (OUTPATIENT)
Facility: MEDICAL CENTER | Age: 56
End: 2021-01-24
Attending: FAMILY MEDICINE
Payer: COMMERCIAL

## 2021-01-24 PROCEDURE — 82274 ASSAY TEST FOR BLOOD FECAL: CPT

## 2021-01-27 DIAGNOSIS — Z12.11 COLON CANCER SCREENING: ICD-10-CM

## 2021-01-28 LAB — HEMOCCULT STL QL IA: NEGATIVE

## 2021-07-20 ENCOUNTER — TELEPHONE (OUTPATIENT)
Dept: MEDICAL GROUP | Age: 56
End: 2021-07-20

## 2021-07-20 NOTE — TELEPHONE ENCOUNTER
Phone Number Called: [phone] (home)     Call outcome: Did not leave a detailed message. Requested patient to call back.    Message: lvm in regards to patinet no show on 07/19/2021 and to reschedule appointment

## 2022-07-28 ENCOUNTER — TELEPHONE (OUTPATIENT)
Dept: SCHEDULING | Facility: IMAGING CENTER | Age: 57
End: 2022-07-28
Payer: COMMERCIAL

## 2022-07-28 NOTE — TELEPHONE ENCOUNTER
Outcome: Left Message TO SCHEDULE DM [facility] WITH PCP    Please transfer to Patient Outreach Team at [phone] when patient returns call.    WebIZ Checked & Epic Updated:  yes    [facility] Verified: no    Attempt # 1